# Patient Record
Sex: FEMALE | Race: WHITE | NOT HISPANIC OR LATINO | Employment: UNEMPLOYED | URBAN - METROPOLITAN AREA
[De-identification: names, ages, dates, MRNs, and addresses within clinical notes are randomized per-mention and may not be internally consistent; named-entity substitution may affect disease eponyms.]

---

## 2024-03-20 ENCOUNTER — OFFICE VISIT (OUTPATIENT)
Dept: FAMILY MEDICINE | Facility: CLINIC | Age: 49
End: 2024-03-20
Payer: COMMERCIAL

## 2024-03-20 VITALS
WEIGHT: 238.2 LBS | DIASTOLIC BLOOD PRESSURE: 88 MMHG | SYSTOLIC BLOOD PRESSURE: 131 MMHG | OXYGEN SATURATION: 99 % | TEMPERATURE: 98.6 F | HEART RATE: 92 BPM | RESPIRATION RATE: 16 BRPM | BODY MASS INDEX: 40.67 KG/M2 | HEIGHT: 64 IN

## 2024-03-20 DIAGNOSIS — F90.0 ATTENTION DEFICIT HYPERACTIVITY DISORDER (ADHD), PREDOMINANTLY INATTENTIVE TYPE: Primary | ICD-10-CM

## 2024-03-20 PROBLEM — K52.1 DRUG-INDUCED DIARRHEA: Status: ACTIVE | Noted: 2023-09-11

## 2024-03-20 PROBLEM — C50.911: Status: ACTIVE | Noted: 2022-03-05

## 2024-03-20 PROBLEM — T45.1X5A CHEMOTHERAPY-INDUCED NEUTROPENIA (H): Status: ACTIVE | Noted: 2022-03-07

## 2024-03-20 PROBLEM — D64.89 OTHER SPECIFIED ANEMIAS: Status: ACTIVE | Noted: 2023-02-13

## 2024-03-20 PROBLEM — L70.9 ACNE: Status: ACTIVE | Noted: 2018-02-12

## 2024-03-20 PROBLEM — D50.9 IRON DEFICIENCY ANEMIA: Status: ACTIVE | Noted: 2023-01-18

## 2024-03-20 PROBLEM — Z15.89 MONOALLELIC MUTATION OF ATM GENE: Status: ACTIVE | Noted: 2022-07-25

## 2024-03-20 PROBLEM — Z15.09 MONOALLELIC MUTATION OF ATM GENE: Status: ACTIVE | Noted: 2022-07-25

## 2024-03-20 PROBLEM — C50.911 MALIGNANT NEOPLASM OF RIGHT BREAST IN FEMALE, ESTROGEN RECEPTOR POSITIVE (H): Status: ACTIVE | Noted: 2023-03-27

## 2024-03-20 PROBLEM — D70.1 CHEMOTHERAPY-INDUCED NEUTROPENIA (H): Status: ACTIVE | Noted: 2022-03-07

## 2024-03-20 PROBLEM — Z90.11 S/P RIGHT MASTECTOMY: Status: ACTIVE | Noted: 2022-08-10

## 2024-03-20 PROBLEM — E89.40 POSTSURGICAL MENOPAUSE: Status: ACTIVE | Noted: 2024-03-20

## 2024-03-20 PROBLEM — R87.610 ASCUS OF CERVIX WITH NEGATIVE HIGH RISK HPV: Status: ACTIVE | Noted: 2023-02-03

## 2024-03-20 PROBLEM — Z15.01 MONOALLELIC MUTATION OF ATM GENE: Status: ACTIVE | Noted: 2022-07-25

## 2024-03-20 PROBLEM — Z17.0 MALIGNANT NEOPLASM OF RIGHT BREAST IN FEMALE, ESTROGEN RECEPTOR POSITIVE (H): Status: ACTIVE | Noted: 2023-03-27

## 2024-03-20 PROCEDURE — 99203 OFFICE O/P NEW LOW 30 MIN: CPT | Performed by: PHYSICIAN ASSISTANT

## 2024-03-20 RX ORDER — BUPROPION HYDROCHLORIDE 100 MG/1
100 TABLET, EXTENDED RELEASE ORAL
COMMUNITY

## 2024-03-20 RX ORDER — TRAZODONE HYDROCHLORIDE 50 MG/1
1 TABLET, FILM COATED ORAL AT BEDTIME
COMMUNITY
Start: 2024-03-05

## 2024-03-20 RX ORDER — VENLAFAXINE HYDROCHLORIDE 75 MG/1
75 CAPSULE, EXTENDED RELEASE ORAL
COMMUNITY
Start: 2022-09-24

## 2024-03-20 RX ORDER — VENLAFAXINE HYDROCHLORIDE 225 MG/1
TABLET, EXTENDED RELEASE ORAL DAILY
COMMUNITY

## 2024-03-20 RX ORDER — ANASTROZOLE 1 MG/1
1 TABLET ORAL
COMMUNITY
Start: 2024-01-02

## 2024-03-20 RX ORDER — CLINDAMYCIN AND BENZOYL PEROXIDE 10; 50 MG/G; MG/G
GEL TOPICAL
COMMUNITY
Start: 2023-03-27

## 2024-03-20 RX ORDER — LISDEXAMFETAMINE DIMESYLATE 50 MG/1
50 CAPSULE ORAL DAILY
COMMUNITY

## 2024-03-20 RX ORDER — DEXTROAMPHETAMINE SACCHARATE, AMPHETAMINE ASPARTATE MONOHYDRATE, DEXTROAMPHETAMINE SULFATE AND AMPHETAMINE SULFATE 2.5; 2.5; 2.5; 2.5 MG/1; MG/1; MG/1; MG/1
10 CAPSULE, EXTENDED RELEASE ORAL DAILY
COMMUNITY

## 2024-03-20 RX ORDER — BIOTIN 10000 MCG
10000 CAPSULE ORAL
COMMUNITY

## 2024-03-20 SDOH — HEALTH STABILITY: PHYSICAL HEALTH: ON AVERAGE, HOW MANY DAYS PER WEEK DO YOU ENGAGE IN MODERATE TO STRENUOUS EXERCISE (LIKE A BRISK WALK)?: 0 DAYS

## 2024-03-20 SDOH — HEALTH STABILITY: PHYSICAL HEALTH: ON AVERAGE, HOW MANY MINUTES DO YOU ENGAGE IN EXERCISE AT THIS LEVEL?: 0 MIN

## 2024-03-20 ASSESSMENT — PATIENT HEALTH QUESTIONNAIRE - PHQ9
SUM OF ALL RESPONSES TO PHQ QUESTIONS 1-9: 15
SUM OF ALL RESPONSES TO PHQ QUESTIONS 1-9: 15
10. IF YOU CHECKED OFF ANY PROBLEMS, HOW DIFFICULT HAVE THESE PROBLEMS MADE IT FOR YOU TO DO YOUR WORK, TAKE CARE OF THINGS AT HOME, OR GET ALONG WITH OTHER PEOPLE: VERY DIFFICULT

## 2024-03-20 ASSESSMENT — SOCIAL DETERMINANTS OF HEALTH (SDOH): HOW OFTEN DO YOU GET TOGETHER WITH FRIENDS OR RELATIVES?: MORE THAN THREE TIMES A WEEK

## 2024-03-20 NOTE — COMMUNITY RESOURCES LIST (ENGLISH)
March 20, 2024           YOUR PERSONALIZED LIST OF SERVICES & PROGRAMS           & RECREATION    Sports      [place] - Sports clubs and recreational activities  [address] [place] [address] (Distance: 4.5 miles)  Phone: [phone]  Website: http://www.coin4ce  Language: English  Fee: Self pay  Accessibility: Ada accessible, Translation services      [place] - Sports clubs and recreational activities - [facility]  [address] [place] [address] (Distance: 2.2 miles)  Language: English  Fee: Self pay, Sliding scale      [facility] - Adult Enrichment  Phone: [phone]  Website: https://Anametrix/adults-seniors/adult-enrichment/  Language: English  Hours: Mon 7:30 AM - 4:00 PM Tue 7:30 AM - 4:00 PM Wed 7:30 AM - 4:00 PM Thu 7:30 AM - 4:00 PM Fri 7:30 AM - 4:00 PM    Classes/Groups      [facility] - Personal Training  [address] [place] [address] (Distance: 2.2 miles)  Phone: [phone]  Website: https://www.Yamli/locations/[facility]/health__fitness/personal_training  Language: English      [facility] - FREE GROUP EXERCISE CLASSES  [address] [place] [address] (Distance: 2.2 miles)  Phone: [phone]  Website: https://www.Yamli/locations/[facility]/health__fitness/free_group_exercise_classes  Language: English      Workouts - Exercise Class/At Home Workouts  Website: https://homeworkouts.org/  Language: English               IMPORTANT NUMBERS & WEBSITES        Emergency Services  911  .   United Way  211 http://211unitedway.org  .   Poison Control  (234) 387-7714 http://mnpoison.org http://wisconsinpoison.org  .     Suicide and Crisis Lifeline  988 http://988lifeline.org  .   Childhelp National Child Abuse Hotline  895.463.7321 http://Childhelphotline.org   .   National Sexual Assault Hotline  (550) 825-9099 (HOPE) http://[facility] [place].org   .     [facility]  Safeline  (522) 716-2790 (RUNAWAY) http://ClerkruRover.com.org  .   Pregnancy & Postpartum Support  Call/text 383-868-6218  [place]: http://ppsupportmn.org  [place]: http://psichapters.com/wi  .   Substance Abuse National Helpline ([facility])  193-844-HELP (5566) http://Findtreatment.gov   .                DISCLAIMER: [employer] does not endorse any service providers mentioned in this resource list. [employer] does not guarantee that the services mentioned in this resource list will be available to you or will improve your health or wellness.    [facility]

## 2024-03-20 NOTE — COMMUNITY RESOURCES LIST (ENGLISH)
March 20, 2024           YOUR PERSONALIZED LIST OF SERVICES & PROGRAMS           & RECREATION    Sports      [place] - Sports clubs and recreational activities - [facility]  [address] [place] [address] (Distance: 2.2 miles)  Language: English  Fee: Self pay, Sliding scale      [employer] - Sports clubs and recreational activities  [address] [place] [address] (Distance: 4.5 miles)  Phone: [phone]  Website: http://www.Open Dynamics  Language: English  Fee: Self pay  Accessibility: Ada accessible, Translation services      [facility] - Adult Enrichment  Phone: [phone]  Website: https://Element ID/adults-seniors/adult-enrichment/  Language: English  Hours: Mon 7:30 AM - 4:00 PM Tue 7:30 AM - 4:00 PM Wed 7:30 AM - 4:00 PM Thu 7:30 AM - 4:00 PM Fri 7:30 AM - 4:00 PM    Classes/Groups      [facility] - Personal Training  [address] [place] [address] (Distance: 2.2 miles)  Phone: [phone]  Website: https://www.GamyTech/locations/[facility]/health__fitness/personal_training  Language: English      [facility] - FREE GROUP EXERCISE CLASSES  [address] [place] [address] (Distance: 2.2 miles)  Phone: [phone]  Website: https://www.GamyTech/locations/[facility]/health__fitness/free_group_exercise_classes  Language: English      Workouts - Exercise Class/At Home Workouts  Website: https://homeworkouts.org/  Language: English               IMPORTANT NUMBERS & WEBSITES        Emergency Services  911  .   United Way  211 http://211unitedway.org  .   Poison Control  (798) 261-3554 http://mnpoison.org http://wisconsinpoison.org  .     Suicide and Crisis Lifeline  988 http://988lifeline.org  .   Childhelp National Child Abuse Hotline  749.976.4554 http://Childhelphotline.org   .   National Sexual Assault Hotline  (382) 650-4852 (HOPE) http://[facility].org   .     [facility]  Safeline  (987) 917-9457 (RUNAWAY) http://Lud[employer] LabsruManhattan Scientifics.org  .   Pregnancy & Postpartum Support  Call/text [phone]  [place]: http://ppsupportmn.org  [place]: http://psichapters.com/wi  .   Substance Abuse National Helpline ([facility])  338-795-HELP (6609) http://Findtreatment.gov   .                DISCLAIMER: [employer] does not endorse any service providers mentioned in this resource list. [employer] does not guarantee that the services mentioned in this resource list will be available to you or will improve your health or wellness.    [facility]

## 2024-03-20 NOTE — COMMUNITY RESOURCES LIST (ENGLISH)
March 20, 2024           YOUR PERSONALIZED LIST OF SERVICES & PROGRAMS           & RECREATION    Sports      [place] - Sports clubs and recreational activities - [facility]  [address] [place] [address] (Distance: 2.2 miles)  Language: English  Fee: Self pay, Sliding scale      [employer] - Sports clubs and recreational activities  [address] [place] [address] (Distance: 4.5 miles)  Phone: [phone]  Website: http://www.SportEmp.com  Language: English  Fee: Self pay  Accessibility: Ada accessible, Translation services      [facility] - Adult Enrichment  Phone: [phone]  Website: https://Ezose Sciences/adults-seniors/adult-enrichment/  Language: English  Hours: Mon 7:30 AM - 4:00 PM Tue 7:30 AM - 4:00 PM Wed 7:30 AM - 4:00 PM Thu 7:30 AM - 4:00 PM Fri 7:30 AM - 4:00 PM    Classes/Groups      [facility] - Personal Training  [address] [place] [address] (Distance: 2.2 miles)  Phone: [phone]  Website: https://www.Promodity/locations/[facility]/health__fitness/personal_training  Language: English      [facility] - FREE GROUP EXERCISE CLASSES  [address] [place] [address] (Distance: 2.2 miles)  Phone: [phone]  Website: https://www.Promodity/locations/[facility]/health__fitness/free_group_exercise_classes  Language: English      Workouts - Exercise Class/At Home Workouts  Website: https://homeworkouts.org/  Language: English               IMPORTANT NUMBERS & WEBSITES        Emergency Services  911  .   United Way  211 http://211unitedway.org  .   Poison Control  (382) 327-1052 http://mnpoison.org http://wisconsinpoison.org  .     Suicide and Crisis Lifeline  988 http://988lifeline.org  .   Childhelp National Child Abuse Hotline  654.701.8946 http://Childhelphotline.org   .   National Sexual Assault Hotline  (178) 473-2654 (HOPE) http://[facility].org   .     [facility]  Safeline  (194) 231-6104 (RUNAWAY) http://AusraruMichelle Kaufmann Designs.org  .   Pregnancy & Postpartum Support  Call/text [phone]  [place]: http://ppsupportmn.org  [place]: http://psichapters.com/wi  .   Substance Abuse National Helpline ([facility])  303-681-HELP (1163) http://Findtreatment.gov   .                DISCLAIMER: [employer] does not endorse any service providers mentioned in this resource list. [employer] does not guarantee that the services mentioned in this resource list will be available to you or will improve your health or wellness.    [facility]

## 2024-03-20 NOTE — PROGRESS NOTES
"  Assessment & Plan     Attention deficit hyperactivity disorder (ADHD), predominantly inattentive type  Patient requesting referral to see Dr. Vega for her ADHD.  Currently on medications, has been tested previously.  Referral placed per patient request.  - Adult Mental Health  Referral        BMI  Estimated body mass index is 41.21 kg/m  as calculated from the following:    Height as of this encounter: 1.619 m (5' 3.75\").    Weight as of this encounter: 108 kg (238 lb 3.2 oz).   Weight management plan: Discussed healthy diet and exercise guidelines    Depression Screening Follow Up        3/20/2024     8:50 AM   PHQ   PHQ-9 Total Score 15   Q9: Thoughts of better off dead/self-harm past 2 weeks Not at all         3/20/2024     8:50 AM   Last PHQ-9   1.  Little interest or pleasure in doing things 2   2.  Feeling down, depressed, or hopeless 1   3.  Trouble falling or staying asleep, or sleeping too much 3   4.  Feeling tired or having little energy 3   5.  Poor appetite or overeating 3   6.  Feeling bad about yourself 1   7.  Trouble concentrating 2   8.  Moving slowly or restless 0   Q9: Thoughts of better off dead/self-harm past 2 weeks 0   PHQ-9 Total Score 15         Follow Up Actions Taken  Crisis resource information provided in After Visit Summary  Referred patient back to PCP       Risks, benefits and alternatives were discussed with patient. Agreeable to the plan of care.      Opal Hilario is a 48 year old, presenting for the following health issues:  Referral (Needs referral for psychology to [facility]/)        3/20/2024     8:51 AM   Additional Questions   Roomed by Yanira CHEW CMA     HPI     Patient is here today for referral for her ADHD  She wants to see Dr. [facility] at [facility]  Has been diagnosed with ADHD, on Adderall and Vyvanse  She still really struggles with her ADHD despite medication      Review of Systems  Constitutional, HEENT, cardiovascular, " pulmonary, gi and gu systems are negative, except as otherwise noted.      Objective    BP (!) 126/93 (BP Location: Left arm, Patient Position: Sitting, Cuff Size: Adult Regular)   Pulse 90   Temp 98.6  F (37  C) (Oral)   Resp 16   Wt 108 kg (238 lb 3.2 oz)   LMP 03/17/2022 (Exact Date)   SpO2 99%   There is no height or weight on file to calculate BMI.  Physical Exam   GENERAL: alert and no distress  NECK: no adenopathy, no asymmetry, masses, or scars  RESP: lungs clear to auscultation - no rales, rhonchi or wheezes  CV: regular rate and rhythm, normal S1 S2, no S3 or S4, no murmur, click or rub, no peripheral edema  MS: no gross musculoskeletal defects noted, no edema          Signed Electronically by: Nishi Hurd PA-C    Answers submitted by the patient for this visit:  Patient Health Questionnaire (Submitted on 3/20/2024)  If you checked off any problems, how difficult have these problems made it for you to do your work, take care of things at home, or get along with other people?: Very difficult  PHQ9 TOTAL SCORE: 15

## 2024-07-07 ASSESSMENT — PATIENT HEALTH QUESTIONNAIRE - PHQ9
SUM OF ALL RESPONSES TO PHQ QUESTIONS 1-9: 9
10. IF YOU CHECKED OFF ANY PROBLEMS, HOW DIFFICULT HAVE THESE PROBLEMS MADE IT FOR YOU TO DO YOUR WORK, TAKE CARE OF THINGS AT HOME, OR GET ALONG WITH OTHER PEOPLE: VERY DIFFICULT
SUM OF ALL RESPONSES TO PHQ QUESTIONS 1-9: 9

## 2024-07-08 ENCOUNTER — VIRTUAL VISIT (OUTPATIENT)
Dept: PSYCHIATRY | Facility: CLINIC | Age: 49
End: 2024-07-08
Attending: PSYCHOLOGIST
Payer: COMMERCIAL

## 2024-07-08 DIAGNOSIS — F33.0 MILD EPISODE OF RECURRENT MAJOR DEPRESSIVE DISORDER (H): Primary | ICD-10-CM

## 2024-07-08 DIAGNOSIS — F41.9 ANXIETY DISORDER, UNSPECIFIED TYPE: ICD-10-CM

## 2024-07-08 DIAGNOSIS — F90.0 ATTENTION DEFICIT HYPERACTIVITY DISORDER (ADHD), PREDOMINANTLY INATTENTIVE TYPE: ICD-10-CM

## 2024-07-08 PROCEDURE — 90837 PSYTX W PT 60 MINUTES: CPT | Mod: 95 | Performed by: PSYCHOLOGIST

## 2024-07-08 NOTE — PROGRESS NOTES
[facility]  [address], [address]  [place] [address]  [phone]     OUTPATIENT PSYCHOTHERAPY ENCOUNTER      PATIENT     Name: Yanelis Lyons  YOB: 1975     SERVICES PROVIDED    Date of Service: 7/8/2024   Time of Service: 1005 to 1113 am (67 Minutes)   Type of Service: 08905 Individual Psychotherapy (53+ min)   Service Provider: David Vega, , LP     TELEMEDICINE ENCOUNTER METHODS     Telemedicine psychotherapy was conducted due to the preference Yanelis during scheduling. The patient's condition was safely assessed and treated via synchronous audio and visual telemedicine encounter. A secure real time interactive audio and visual telecommunication system (videoconference via gauzz) was used for the visit.     Patient Visit Location: Home in [place]      Present For Encounter: Yanelis     Provider Visit Location: HIPAA compliant location within provider home       ENCOUNTER SYNOPSIS     Yanelis participated in a psychotherapy session today with David Vega, PhD, LP. Background information, history, current condition, and available medical record notes were reviewed, and a brief clinical interview was conducted with Yanelis to update the treatment focus and planning as indicated. There was opportunity for Yanelis to discuss and process recent life happenings. The session included providing Yanelis with an evidence-informed, strengths-based, whole-person mental health and wellbeing-focused model of psychotherapy (see Treatment Plan and Psychotherapy Interventions). Coordination of care was also planned with other healthcare providers working with Yanelis if indicated.     BACKGROUND INFORMATION      Yanelis lives with her father and brother.  She has a BA degree in physics.  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    Yanelis is currently unemployed and her more recent work  "history is \"spotty.\"  She has reportedly been fired from many jobs due to ADHD symptoms.  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    Yanelis was diagnosed with breast cancer in 2022.  She was successfully treated with chemotherapy, mastectomy, and radiation.    Yanelis is receiving mental health care.  She receives psychiatric care from Dr. Leora Driscoll.  Her diagnosis is reported to be ADHD and she is currently on Adderall.  In addition, she participates in ADHD support groups.    Currently, Yanelis is seeking ADHD informed psychotherapy with the current provider.  Her goal is to become \"reliable and productive again\" and learn how to deal with her executive functioning challenges.    Psychiatric Symptoms         The following symptoms and concerns are to be targeted in the treatment of Yanelis.      Inattention/Hyperactivity/Impulsivity: Reported she was diagnosed with ADHD at the age of 34; even though she is on Adderall, Yanelis reported current symptoms of inattention and to some degree also hyperactivity/impulsivity; in particular she gets overwhelmed with work/tasks and shuts down; indicated her executive functioning is poor and especially being disorganized, having a hard time staying focused, and getting frustrated a lot   Depression: Reported mild recurrent depressive symptoms including recently sadness, irritability, decreased energy, low motivation, poor concentration, sleep disturbance, negative thoughts    Hypomania/Josephine: None reported    Anxiety: Reported recurrent symptoms of worrying, ruminating, nervousness, heightened physical tension, avoidance     Panic: None reported   Obsessions/Compulsions: None reported   Post-Traumatic Stress: None reported   Sleep Problems and Fatigue: Reported her sleep is a \"mess\" since being treated " "for cancer with side effects coming from the many medications she has been on; noted she uses trazodone intermittently for sleep as needed   Alcohol Abuse: None reported   Drug Abuse: None reported     Psychotic: None reported     Suicidal Thoughts: None reported recently; had suicidal ideation as a teenager    Cutting/Self-Injury: None reported         Questionnaire Data (Submitted on 7/7/2024)    PHQ9 Total Score: 9 (mild depression)     Functional Concerns      The following functional concerns are to be targeted in the treatment of Yanelis.      Home: Reported struggling with household tasks and upkeep      School: Reported having difficulty being organized and completing homework and academic tasks in high school and college      Work: Reported is distractible, off task, forgets things, etc. on the job and she has been fired from several jobs as a result    Financial: Reported prior history of significant credit card debt due to overspending        History     The following reflect pertinent historical findings identified for Yanelis.      Developmental Delays: None reported    Family Problems: Reported that her mother passed away in 2021 from a lung disease; indicated her mother was \"the center of the family\" and the family is struggling without her    Child Abuse/Neglect/Trauma: None reported    Educational Problems: None reported    Mental Health Problems: Reported had undiagnosed depression during her teenage years and that she was officially diagnosed while she attended college; noted she has been struggling with recurrent depressive symptoms throughout adulthood    Physical Health Problems: Reported prior history of breast cancer that was successfully treated and is currently in remission   Family History of Mental Health Problems: Reported suspicion that her father has ADHD but this has not been officially diagnosed; believes her brother is depressed     Other Problems: None reported      Strengths " and Protective Factors      The following reflect strengths and protective factors that could be leveraged in treatment of Yanelis.      Personal: Reported she is persistent and that she is good with knitting, sewing, cross stitching, and painting     Support System: Reported to be her father and brother      TREATMENT PLAN AND PSYCHOTHERAPY UPDATE FOR THIS ENCOUNTER     Diagnoses Being Treated      Yanelis qualifies for diagnoses of ADHD-I; major depression, recurrent, mild; anxiety disorder, unspecified     Treatment Symptoms Goals Addressed and Progress      Based on the Background Information above, the following personalized psychiatric symptom domains for Yanelis are targeted for improvement through psychotherapy. The goal is to achieve an 8 or higher over the course of treatment. Progress to date ranges from 0 Limited to 5 Some to 10 Significant. This progress is episodically reviewed with Yanelis to make treatment decisions.     Improve Understanding of Condition/Illness: Baseline estimated to be 6  Reduce Inattention: Baseline estimated to be 5  Reduce Depression: Baseline estimated to be 5  Reduce Anxiety: Baseline estimated to be 6  Reduce Sleep Problems and Fatigue: Baseline estimated to be 5    Functional Goals Addressed and Progress      Based on the Background Information above, the following personalized functional domains for Yanelis are targeted for improvement through psychotherapy. The goal is to achieve an 8 or higher over the course of treatment. Progress to date ranges from 0 Limited to 5 Some to 10 Significant. This progress is episodically reviewed with Yanelis to make treatment decisions.     Home: Baseline estimated to be 5  School: NA currently  Work: Baseline estimated to be 4  Financial: NA currently    Mental Health and Wellbeing Health Practices Addressed and Progress     Using a brief psychotherapy collaborative decision aid, Yanelis was guided to make self-ratings for  perceived level of mastery in 12 Health Practices that are aligned with Four Worlds of Mental Health and Wellbeing. Ratings range from 0 = Not Good at This to 10 = Great at This. Ratings below were recorded on 7/8/2024.      Internal World: Regulation of Emotions, Thoughts, and Stress   Soothe Health Practice: I am calm and manage my emotions - Historical to Current: 5.5  Think Health Practice: I have positive, hopeful and helpful thoughts - Historical to Current: 5 (indicated being self-critical)  Rest Health Practice: I am well rested and energized - Historical to Current: 4    Physical World: Physical Health      Hydrate Health Practice: I drink enough water every day and stay hydrated - Historical to Current: 6  Nourish Health Practice: I eat healthy - Historical to Current: 4 (indicated does not eat breakfast)  Move Health Practice: I am physically active - Historical to Current: 2    External World: Social Relationships and Productive Behaviors   Express Health Practice: I listen to others and express myself well  - Historical to Current: 8  Connect Health Practice: I have positive, healthy and meaningful relationships - Historical to Current: 7  Activate Health Practice: I am responsible and successful at home, work, school - Historical to Current: 3      Spiritual World: Contemplation, Purpose and Meaning, and Living According to Beliefs   Observe Health Practice: I am thankful, accepting, and live in the moment - Historical to Current: 7.5  Seek Health Practice: I do things that give me meaning and purpose - Historical to Current: 7.5  Believe Health Practice: My values and beliefs guide how I am living - Historical to Current: 7.5    Psychotherapy Interventions Provided to Improve Symptoms, Functioning, and Mental Health and Wellbeing      This session focused on introducing Yanelis to a strengths-based, whole-person mental health and wellbeing-focused model of psychotherapy. This approach incorporates  empirically validated practice elements derived from cognitive behavioral therapy, acceptance and commitment therapy, positive psychology, mindfulness, and habit formation science. Specific psychotherapeutic modules within the model are personalized to help Yanelis work on and make progress with treatment goals. Culturally and trauma informed care principles are incorporated to contextualize therapeutic strategies to best fit the life experiences and views of Yanelis as indicated.     Based on above ratings and patient preference, Yanelis will be guided to work on personalized psychotherapeutic modules within identified Health Practices to improve daily functioning and overall mental health and wellbeing.  These will be determined and focused on during the next session.      MENTAL STATUS EXAM     Yanelis was observed for the following indicators of mental status.     Appearance/Behavior: Adequate grooming, appropriate attire, good eye contact, calm, cooperative, no abnormal movements   Speech: Rate, volume and tone appropriate; no push or pressure; no rapid speech     Mood/Affect: Calm, affect congruent to situation   Thought Process/Content: Coherent, linear; no reports or evidence of auditory hallucinations; no reports or evidence of visual hallucinations    Thought Associations: Logical, no looseness of associations, no flight of ideas, no tangentially, no circumstantiality   Insight/Judgment: Adequate insight into condition and need for treatment; judgement not impaired   Orientation: Alert and oriented to person place, time, and circumstance   Recent and Past Memory: Good    Attention Span/Concentration: Good     Language: English with estimated vocabulary to be average or above   Fund of Knowledge: Estimated to be average or above fund of knowledge      SAFETY PLAN - NA, no SI or SIB     RECOMMENDATIONS     Based on a medical records review and the results of this encounter, it is recommended that  Yanelis participate in the following behavioral health services.      Mental Health Outpatient Psychotherapy: Individual-focused skills and habit training with therapeutic processing to improve mental health and wellbeing, conducted by Dr. Vega   Psychiatric Evaluation and Services: Continue consulting with Dr. Driscoll for psychiatric care including psychiatric diagnostic clarification, medication management, care coordination, and psychotherapy as needed    Primary and Specialty Care Services: Continue consulting with specialty care providers for cancer and PCP for healthcare, medication management, and care coordination as needed        DISPOSITION AND PLAN      Psychotherapy was conducted with Yanelis to make progress on treatment goals, enhance daily functioning, and improve mental health and wellbeing. Motivational interviewing was used to promote insight and engagement and to personalize psychotherapy interventions. If applicable, a safety plan was formulated and reviewed with Yanelis.      The engagement of Yanelis during the session is assessed by this provider as: High     The progress of Etienne on treatment plan goals and objectives is assessed by this provider as: NA      In line with feedback informed therapy, Yanelis was guided to complete ratings of perceived Helpfulness and Hopefulness regarding the current encounter, and the results and are recorded below. The results were collaboratively discussed to make adjustments or improvements in subsequent psychotherapy encounters.      Degree of helpfulness of this visit on a scale ranging from 0 is Not Helpful to 10 is Very Helpful - Historical to Current: Did not review  Degree of hopefulness in improving mental health and wellbeing on a scale ranging from 0 Not Hopeful to 10 Very Hopeful - Historical to Current: Did not review    The plan is for Yanelis to follow up with this provider for outpatient psychotherapy and will seek  additional recommended services as indicated to continue work on treatment goals.     If there are any questions regarding this information please contact the [facility] at [phone].    David Vega, PhD, LP   Professor of Psychiatry and Behavioral Sciences  [facility]

## 2024-07-18 ENCOUNTER — VIRTUAL VISIT (OUTPATIENT)
Dept: PSYCHIATRY | Facility: CLINIC | Age: 49
End: 2024-07-18
Attending: PSYCHOLOGIST
Payer: COMMERCIAL

## 2024-07-18 DIAGNOSIS — F33.0 MILD EPISODE OF RECURRENT MAJOR DEPRESSIVE DISORDER (H): ICD-10-CM

## 2024-07-18 DIAGNOSIS — F90.0 ATTENTION DEFICIT HYPERACTIVITY DISORDER (ADHD), PREDOMINANTLY INATTENTIVE TYPE: Primary | ICD-10-CM

## 2024-07-18 DIAGNOSIS — F41.9 ANXIETY DISORDER, UNSPECIFIED TYPE: ICD-10-CM

## 2024-07-18 PROCEDURE — 90837 PSYTX W PT 60 MINUTES: CPT | Mod: 95 | Performed by: PSYCHOLOGIST

## 2024-07-23 NOTE — PROGRESS NOTES
[facility]  [address], [address]  [place] [address]  [phone]     OUTPATIENT PSYCHOTHERAPY ENCOUNTER      PATIENT     Name: Yanelis Lyons  YOB: 1975     SERVICES PROVIDED    Date of Service: 7/18/2024   Time of Service: 300 to 401 pm (61 Minutes)   Type of Service: 15477 Individual Psychotherapy (53+ min)   Service Provider: David Vega, PhD, LP     TELEMEDICINE ENCOUNTER METHODS     Telemedicine psychotherapy was conducted due to the preference Yanelis during scheduling. The patient's condition was safely assessed and treated via synchronous audio and visual telemedicine encounter. A secure real time interactive audio and visual telecommunication system (videoconference via [employer]) was used for the visit.     Patient Visit Location: Home in [place]      Present For Encounter: Yanelis     Provider Visit Location: HIPAA compliant location within provider home       ENCOUNTER SYNOPSIS     Yanelis participated in a psychotherapy session today with David Vega, PhD, LP. Background information, history, current condition, and available medical record notes were reviewed, and a brief clinical interview was conducted with Yanelis to update the treatment focus and planning as indicated. There was opportunity for Yanelis to discuss and process recent life happenings. The session included providing Yanelis with an evidence-informed, strengths-based, whole-person mental health and wellbeing-focused model of psychotherapy (see Treatment Plan and Psychotherapy Interventions). Coordination of care was also planned with other healthcare providers working with Yanelis if indicated.     BACKGROUND INFORMATION (From 7/8/2024 Intake; Updated 7/18/2024)      Yanelis lives with her father and brother.  She has a BA degree in physics.  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    Yanelis is currently  "unemployed and her more recent work history is \"spotty.\"  She has reportedly been fired from many jobs due to ADHD symptoms.  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    Yanelis was diagnosed with breast cancer in 2022.  She was successfully treated with chemotherapy, mastectomy, and radiation.    Yanelis is receiving mental health care.  She receives psychiatric care from Dr. Leora Driscoll.  Her diagnosis is reported to be ADHD and she is currently on Adderall.  In addition, she participates in ADHD support groups.    Currently, Yanelis is seeking ADHD informed psychotherapy with the current provider.  Her goal is to become \"reliable and productive again\" and learn how to deal with her executive functioning challenges.    Psychiatric Symptoms         The following symptoms and concerns are to be targeted in the treatment of Yanelis.      Inattention/Hyperactivity/Impulsivity: Reported she was diagnosed with ADHD at the age of 34; even though she is on Adderall, Yanelis reported current symptoms of inattention and to some degree also hyperactivity/impulsivity; in particular she gets overwhelmed with work/tasks and shuts down; indicated her executive functioning is poor and especially being disorganized, having a hard time staying focused, and getting frustrated a lot   Depression: Reported mild recurrent depressive symptoms including recently sadness, irritability, decreased energy, low motivation, poor concentration, sleep disturbance, negative thoughts    Hypomania/Josephine: None reported    Anxiety: Reported recurrent symptoms of worrying, ruminating, nervousness, heightened physical tension, avoidance     Panic: None reported   Obsessions/Compulsions: None reported   Post-Traumatic Stress: None reported   Sleep Problems and Fatigue: Reported her " "sleep is a \"mess\" since being treated for cancer with side effects coming from the many medications she has been on; noted she uses trazodone intermittently for sleep as needed   Alcohol Abuse: None reported   Drug Abuse: None reported     Psychotic: None reported     Suicidal Thoughts: None reported recently; had suicidal ideation as a teenager    Cutting/Self-Injury: None reported         Questionnaire Data (Submitted on 7/7/2024)    PHQ9 Total Score: 9 (mild depression)     Functional Concerns      The following functional concerns are to be targeted in the treatment of Yanelis.      Home: Reported struggling with household tasks and upkeep      School: Reported having difficulty being organized and completing homework and academic tasks in high school and college      Work: Reported is distractible, off task, forgets things, etc. on the job and she has been fired from several jobs as a result    Financial: Reported prior history of significant credit card debt due to overspending        History     The following reflect pertinent historical findings identified for Yanelis.      Developmental Delays: None reported    Family Problems: Reported that her mother was impatient with her growing up and often yelled at her; indicated that her mother passed away in 2021 from a lung disease; observed that her mother was \"the center of the family\" and the family is struggling without her    Child Abuse/Neglect/Trauma: None reported    Educational Problems: None reported    Mental Health Problems: Reported had undiagnosed depression during her teenage years and that she was officially diagnosed while she attended college; noted she has been struggling with recurrent depressive symptoms throughout adulthood    Physical Health Problems: Reported prior history of breast cancer that was successfully treated and is currently in remission   Family History of Mental Health Problems: Reported suspicion that her father has ADHD " but this has not been officially diagnosed; believes her brother is depressed     Other Problems: None reported      Strengths and Protective Factors      The following reflect strengths and protective factors that could be leveraged in treatment of Yanelis.      Personal: Reported she is persistent and that she is good with knitting, sewing, cross stitching, and painting     Support System: Reported to be her father and brother      CHECK-IN FOR THIS ENCOUNTER     Recent Happenings     Checked in with Yanelis to review how it has been going recently     Noteworthy Events: Reported routine life events  Noteworthy Stressors/Problems: Reported tension with her brother after an argument; noted a visit with her relatives were politics was discussed and that was stressful       Recent Health Concerns     Checked in with Yanelis to review current health concerns and rated them ranging from 0 = Currently Low Concern to 10 = Currently Very High Concern.      Emotional Health Concerns: Current level of concern about overall distress, anxiety, depression, or stress - Historical to Current: Moderate  Notes: Reported stress and some worrying     Physical Health Concerns: Current level of concern about overall physical health, physical activity, body weight, health habits, or pain - Historical to Current: Moderate  Notes: Reported feeling generally healthy but acknowledges difficulties with mobility and walking due to being sedentary  Living Health Concerns: Current level of concern about functioning at home, at school and/or work, and in important relationships - Historical to Current: NA  Notes: Did not review    Recent Safety Concerns    Self: None reported   Others: None reported       Desired Focus for Encounter     Checked in with Yanelis to determine any desired goals, topics, skills, or habits of focus for this session.      Areas of Focus: None reported      TREATMENT PLAN AND PSYCHOTHERAPY UPDATE FOR THIS  ENCOUNTER     Diagnoses Being Treated      Yanelis qualifies for diagnoses of ADHD-I; major depression, recurrent, mild; anxiety disorder, unspecified     Treatment Symptoms Goals Addressed and Progress      Based on the Background Information above, the following personalized psychiatric symptom domains for Yanelis are targeted for improvement through psychotherapy. The goal is to achieve an 8 or higher over the course of treatment. Progress to date ranges from 0 Limited to 5 Some to 10 Significant. This progress is episodically reviewed with Yanelis to make treatment decisions.     Improve Understanding of Condition/Illness: Baseline estimated to be 6  Reduce Inattention: Baseline estimated to be 5  Reduce Depression: Baseline estimated to be 5  Reduce Anxiety: Baseline estimated to be 6  Reduce Sleep Problems and Fatigue: Baseline estimated to be 5    Functional Goals Addressed and Progress      Based on the Background Information above, the following personalized functional domains for Yanelis are targeted for improvement through psychotherapy. The goal is to achieve an 8 or higher over the course of treatment. Progress to date ranges from 0 Limited to 5 Some to 10 Significant. This progress is episodically reviewed with Yanelis to make treatment decisions.     Home: Baseline estimated to be 5  School: NA currently  Work: Baseline estimated to be 4  Financial: NA currently    Mental Health and Wellbeing Health Practices Addressed and Progress     Using a brief psychotherapy collaborative decision aid, Yanelis was guided to make self-ratings for perceived level of mastery in 12 Health Practices that are aligned with Four Worlds of Mental Health and Wellbeing. Ratings range from 0 = Not Good at This to 10 = Great at This. Ratings below were recorded on 7/8/2024.      Internal World: Regulation of Emotions, Thoughts, and Stress   Soothe Health Practice: I am calm and manage my emotions - Historical to  "Current: 5.5  Think Health Practice: I have positive, hopeful and helpful thoughts - Historical to Current: 5 (indicated being self-critical)  Rest Health Practice: I am well rested and energized - Historical to Current: 4    Physical World: Physical Health      Hydrate Health Practice: I drink enough water every day and stay hydrated - Historical to Current: 6  Nourish Health Practice: I eat healthy - Historical to Current: 4 (indicated does not eat breakfast)  Move Health Practice: I am physically active - Historical to Current: 2    External World: Social Relationships and Productive Behaviors   Express Health Practice: I listen to others and express myself well  - Historical to Current: 8  Connect Health Practice: I have positive, healthy and meaningful relationships - Historical to Current: 7  Activate Health Practice: I am responsible and successful at home, work, school - Historical to Current: 3      Spiritual World: Contemplation, Purpose and Meaning, and Living According to Beliefs   Observe Health Practice: I am thankful, accepting, and live in the moment - Historical to Current: 7.5  Seek Health Practice: I do things that give me meaning and purpose - Historical to Current: 7.5  Believe Health Practice: My values and beliefs guide how I am living - Historical to Current: 7.5    Psychotherapy Interventions Provided to Improve Symptoms, Functioning, and Mental Health and Wellbeing      The first part of the session focused on processing family dynamics with Yanelis.  She noted being \"the family scapegoat\" in the past and currently.  Noted trying to set and enforce boundaries with family members.  Noted it is hard to avoid conflict with family members and it stresses her out sometimes.    This session also focused on introducing Yanelis to a strengths-based, whole-person mental health and wellbeing-focused model of psychotherapy. This approach incorporates empirically validated practice elements derived " from cognitive behavioral therapy, acceptance and commitment therapy, positive psychology, mindfulness, and habit formation science. Specific psychotherapeutic modules within the model are personalized to help Yanelis work on and make progress with treatment goals. Culturally and trauma informed care principles are incorporated to contextualize therapeutic strategies to best fit the life experiences and views of Yanelis as indicated.     Based on above ratings and patient preference, Yanelis was guided to work on personalized psychotherapeutic modules within identified Health Practices that are italicized below to improve daily functioning and overall mental health and wellbeing. Instruction, training, guidance, along with traditional therapeutic processing, is used to promote growth in these areas for Yanelis.      Move Health Practice: Being Physically Active  Be Active Module: Being more active each day (habit formation and positive psychology)  Be Strong and Mobile Module: Building your strength, flexibility, and balance (habit formation and positive psychology)  Be Fit Module: Building your physical fitness (habit formation and positive psychology)   Notes: Reviewed, discussed, and planned for how Yanelis can be more active and less sedentary; reviewed the positive mental health and brain capacity benefits of more regular exercise; she made plans to ride her bike and walk more; she set up a plan for developing a time-based habit (see below)     Get Ready and Take Action: Promoting and Guiding Change  Prepare to Change: Increase insight, vision, desire, motivation, confidence, and commitment to promote a change mindset  Notes: Reviewed, discussed, and plan for how Yanelis can develop her change mindset  Form a Time-Based Habit: Leveraging habit loop at a specified time; At [time cue], I will [action], to gain [reward] (e.g., At 7 am, I will walk 2 miles, to feel better physically and emotionally)     Notes: Reviewed, discussed, and planned for how Yanelis can utilize a time-based habit, specifically at 9 AM most days she will both out to exercise either by walking or riding her bike, for an undetermined length of time that she will gradually determine as she begins her exercise habit    MENTAL STATUS EXAM     Yanelis was observed for the following indicators of mental status.     Appearance/Behavior: Adequate grooming, appropriate attire, good eye contact, calm, cooperative, no abnormal movements   Speech: Rate, volume and tone appropriate; no push or pressure; no rapid speech     Mood/Affect: Calm, affect congruent to situation   Thought Process/Content: Coherent, linear; no reports or evidence of auditory hallucinations; no reports or evidence of visual hallucinations    Thought Associations: Logical, no looseness of associations, no flight of ideas, no tangentially, no circumstantiality   Insight/Judgment: Adequate insight into condition and need for treatment; judgement not impaired   Orientation: Alert and oriented to person place, time, and circumstance   Recent and Past Memory: Good    Attention Span/Concentration: Good     Language: English with estimated vocabulary to be average or above   Fund of Knowledge: Estimated to be average or above fund of knowledge      SAFETY PLAN - NA, no SI or SIB     RECOMMENDATIONS     Based on a medical records review and the results of this encounter, it is recommended that Yanelis participate in the following behavioral health services.      Mental Health Outpatient Psychotherapy: Individual-focused skills and habit training with therapeutic processing to improve mental health and wellbeing, conducted by Dr. Vega   Psychiatric Evaluation and Services: Continue consulting with Dr. Driscoll for psychiatric care including psychiatric diagnostic clarification, medication management, care coordination, and psychotherapy as needed    Primary and Specialty Care  Services: Continue consulting with specialty care providers for cancer and PCP for healthcare, medication management, and care coordination as needed        DISPOSITION AND PLAN      Psychotherapy was conducted with Yanelis to make progress on treatment goals, enhance daily functioning, and improve mental health and wellbeing. Motivational interviewing was used to promote insight and engagement and to personalize psychotherapy interventions. If applicable, a safety plan was formulated and reviewed with Yanelis.      The engagement of Yanelis during the session is assessed by this provider as: High     The progress of Etienne on treatment plan goals and objectives is assessed by this provider as: Moderate       In line with feedback informed therapy, Yanelis was guided to complete ratings of perceived Helpfulness and Hopefulness regarding the current encounter, and the results and are recorded below. The results were collaboratively discussed to make adjustments or improvements in subsequent psychotherapy encounters.      Degree of helpfulness of this visit on a scale ranging from 0 is Not Helpful to 10 is Very Helpful - Historical to Current: Did not review  Degree of hopefulness in improving mental health and wellbeing on a scale ranging from 0 Not Hopeful to 10 Very Hopeful - Historical to Current: Did not review    The plan is for Yanelis to follow up with this provider for outpatient psychotherapy and will seek additional recommended services as indicated to continue work on treatment goals.     If there are any questions regarding this information please contact the [facility] at [phone].    David Vega, PhD, LP   Professor of Psychiatry and Behavioral Sciences  [facility]

## 2024-07-25 ENCOUNTER — VIRTUAL VISIT (OUTPATIENT)
Dept: PSYCHIATRY | Facility: CLINIC | Age: 49
End: 2024-07-25
Attending: PSYCHOLOGIST
Payer: COMMERCIAL

## 2024-07-25 DIAGNOSIS — F33.0 MILD EPISODE OF RECURRENT MAJOR DEPRESSIVE DISORDER (H): ICD-10-CM

## 2024-07-25 DIAGNOSIS — F41.9 ANXIETY DISORDER, UNSPECIFIED TYPE: ICD-10-CM

## 2024-07-25 DIAGNOSIS — F90.0 ATTENTION DEFICIT HYPERACTIVITY DISORDER (ADHD), PREDOMINANTLY INATTENTIVE TYPE: Primary | ICD-10-CM

## 2024-07-25 PROCEDURE — 90837 PSYTX W PT 60 MINUTES: CPT | Mod: 95 | Performed by: PSYCHOLOGIST

## 2024-08-01 ENCOUNTER — VIRTUAL VISIT (OUTPATIENT)
Dept: PSYCHIATRY | Facility: CLINIC | Age: 49
End: 2024-08-01
Attending: PSYCHOLOGIST
Payer: COMMERCIAL

## 2024-08-01 DIAGNOSIS — F90.0 ATTENTION DEFICIT HYPERACTIVITY DISORDER (ADHD), PREDOMINANTLY INATTENTIVE TYPE: Primary | ICD-10-CM

## 2024-08-01 DIAGNOSIS — F41.9 ANXIETY DISORDER, UNSPECIFIED TYPE: ICD-10-CM

## 2024-08-01 DIAGNOSIS — F33.0 MILD EPISODE OF RECURRENT MAJOR DEPRESSIVE DISORDER (H): ICD-10-CM

## 2024-08-01 PROCEDURE — 90837 PSYTX W PT 60 MINUTES: CPT | Mod: 95 | Performed by: PSYCHOLOGIST

## 2024-08-02 NOTE — PROGRESS NOTES
[facility]  [address], [address]  [place] [address]  [phone]     OUTPATIENT PSYCHOTHERAPY ENCOUNTER      PATIENT     Name: Yanelis Lyons  YOB: 1975     SERVICES PROVIDED    Date of Service: 7/25/2024   Time of Service: 205 to 307 pm (62 Minutes)   Type of Service: 66985 Individual Psychotherapy (53+ min)   Service Provider: David Vega, PhD, LP     TELEMEDICINE ENCOUNTER METHODS     Telemedicine psychotherapy was conducted due to the preference Yanelis during scheduling. The patient's condition was safely assessed and treated via synchronous audio and visual telemedicine encounter. A secure real time interactive audio and visual telecommunication system (videoconference via [employer]) was used for the visit.     Patient Visit Location: Home in [place]      Present For Encounter: Yanelis     Provider Visit Location: HIPAA compliant location within provider home       ENCOUNTER SYNOPSIS     Yanelis participated in a psychotherapy session today with David Vega, PhD, LP. Background information, history, current condition, and available medical record notes were reviewed, and a brief clinical interview was conducted with Yanelis to update the treatment focus and planning as indicated. There was opportunity for Yanelis to discuss and process recent life happenings. The session included providing Yanelis with an evidence-informed, strengths-based, whole-person mental health and wellbeing-focused model of psychotherapy (see Treatment Plan and Psychotherapy Interventions). Coordination of care was also planned with other healthcare providers working with Yanelis if indicated.     BACKGROUND INFORMATION (From 7/8/2024 Intake; Updated 7/18/2024)      Yanelis lives with her father and brother.  She has a BA degree in physics.  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    Yanelis is currently  "unemployed and her more recent work history is \"spotty.\"  She has reportedly been fired from many jobs due to ADHD symptoms.  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    Yanelis was diagnosed with breast cancer in 2022.  She was successfully treated with chemotherapy, mastectomy, and radiation.    Yanelis is receiving mental health care.  She receives psychiatric care from Dr. Leora Driscoll.  Her diagnosis is reported to be ADHD and she is currently on Adderall.  In addition, she participates in ADHD support groups.    Currently, Yanelis is seeking ADHD informed psychotherapy with the current provider.  Her goal is to become \"reliable and productive again\" and learn how to deal with her executive functioning challenges.    Psychiatric Symptoms         The following symptoms and concerns are to be targeted in the treatment of Yanelis.      Inattention/Hyperactivity/Impulsivity: Reported she was diagnosed with ADHD at the age of 34; even though she is on Adderall, Yanelis reported current symptoms of inattention and to some degree also hyperactivity/impulsivity; in particular she gets overwhelmed with work/tasks and shuts down; indicated her executive functioning is poor and especially being disorganized, having a hard time staying focused, and getting frustrated a lot   Depression: Reported mild recurrent depressive symptoms including recently sadness, irritability, decreased energy, low motivation, poor concentration, sleep disturbance, negative thoughts    Hypomania/Josephine: None reported    Anxiety: Reported recurrent symptoms of worrying, ruminating, nervousness, heightened physical tension, avoidance     Panic: None reported   Obsessions/Compulsions: None reported   Post-Traumatic Stress: None reported   Sleep Problems and Fatigue: Reported her " "sleep is a \"mess\" since being treated for cancer with side effects coming from the many medications she has been on; noted she uses trazodone intermittently for sleep as needed; had 2 prior sleep studies and was determined to be \"normal\" without apnea or restlessness  Alcohol Abuse: None reported   Drug Abuse: None reported     Psychotic: None reported     Suicidal Thoughts: None reported recently; had suicidal ideation as a teenager    Cutting/Self-Injury: None reported         Questionnaire Data (Submitted on 7/7/2024)    PHQ9 Total Score: 9 (mild depression)     Functional Concerns      The following functional concerns are to be targeted in the treatment of Yanelis.      Home: Reported struggling with household tasks and upkeep      School: Reported having difficulty being organized and completing homework and academic tasks in high school and college      Work: Reported is distractible, off task, forgets things, etc. on the job and she has been fired from several jobs as a result    Financial: Reported prior history of significant credit card debt due to overspending        History     The following reflect pertinent historical findings identified for Yanelis.      Developmental Delays: None reported    Family Problems: Reported that her mother was impatient with her growing up and often yelled at her; indicated that her mother passed away in 2021 from a lung disease; observed that her mother was \"the center of the family\" and the family is struggling without her    Child Abuse/Neglect/Trauma: None reported    Educational Problems: None reported    Mental Health Problems: Reported had undiagnosed depression during her teenage years and that she was officially diagnosed while she attended college; noted she has been struggling with recurrent depressive symptoms throughout adulthood    Physical Health Problems: Reported prior history of breast cancer that was successfully treated and is currently in " remission; noted recently being diagnosed with postural orthostatic tachycardia syndrome (POTS)  Family History of Mental Health Problems: Reported suspicion that her father has ADHD but this has not been officially diagnosed; believes her brother is depressed     Other Problems: None reported      Strengths and Protective Factors      The following reflect strengths and protective factors that could be leveraged in treatment of Yanelis.      Personal: Reported she is persistent and that she is good with knitting, sewing, cross stitching, and painting     Support System: Reported to be her father and brother      CHECK-IN FOR THIS ENCOUNTER     Recent Happenings     Checked in with Yanelis to review how it has been going recently     Noteworthy Events: Reported routine life events  Noteworthy Stressors/Problems: Reported stress related to several medical appointments she has had        Recent Health Concerns     Checked in with Yanelis to review current health concerns and rated them ranging from 0 = Currently Low Concern to 10 = Currently Very High Concern.      Emotional Health Concerns: Current level of concern about overall distress, anxiety, depression, or stress - Historical to Current: Moderate; moderate  Notes: Reported stress and feeling overwhelmed including some depression     Physical Health Concerns: Current level of concern about overall physical health, physical activity, body weight, health habits, or pain - Historical to Current: Moderate; moderate  Notes: Reported feeling generally healthy but acknowledges some dizzy episodes that could be stress related  Living Health Concerns: Current level of concern about functioning at home, at school and/or work, and in important relationships - Historical to Current: NA  Notes: Did not review    Recent Safety Concerns    Self: None reported   Others: None reported       Desired Focus for Encounter     Checked in with Yanelis to determine any desired  goals, topics, skills, or habits of focus for this session.      Areas of Focus: None reported      TREATMENT PLAN AND PSYCHOTHERAPY UPDATE FOR THIS ENCOUNTER     Diagnoses Being Treated      Yanelis qualifies for diagnoses of ADHD-I; major depression, recurrent, mild; anxiety disorder, unspecified     Treatment Symptoms Goals Addressed and Progress      Based on the Background Information above, the following personalized psychiatric symptom domains for Yanelis are targeted for improvement through psychotherapy. The goal is to achieve an 8 or higher over the course of treatment. Progress to date ranges from 0 Limited to 5 Some to 10 Significant. This progress is episodically reviewed with Yanelis to make treatment decisions.     Improve Understanding of Condition/Illness: Baseline estimated to be 6  Reduce Inattention: Baseline estimated to be 5  Reduce Depression: Baseline estimated to be 5  Reduce Anxiety: Baseline estimated to be 6  Reduce Sleep Problems and Fatigue: Baseline estimated to be 5    Functional Goals Addressed and Progress      Based on the Background Information above, the following personalized functional domains for Yanelis are targeted for improvement through psychotherapy. The goal is to achieve an 8 or higher over the course of treatment. Progress to date ranges from 0 Limited to 5 Some to 10 Significant. This progress is episodically reviewed with Yanelis to make treatment decisions.     Home: Baseline estimated to be 5  School: NA currently  Work: Baseline estimated to be 4  Financial: NA currently    Mental Health and Wellbeing Health Practices Addressed and Progress     Using a brief psychotherapy collaborative decision aid, Yanelis was guided to make self-ratings for perceived level of mastery in 12 Health Practices that are aligned with Four Worlds of Mental Health and Wellbeing. Ratings range from 0 = Not Good at This to 10 = Great at This. Ratings below were recorded on  7/8/2024.      Internal World: Regulation of Emotions, Thoughts, and Stress   Soothe Health Practice: I am calm and manage my emotions - Historical to Current: 5.5  Think Health Practice: I have positive, hopeful and helpful thoughts - Historical to Current: 5 (indicated being self-critical)  Rest Health Practice: I am well rested and energized - Historical to Current: 4    Physical World: Physical Health      Hydrate Health Practice: I drink enough water every day and stay hydrated - Historical to Current: 6  Nourish Health Practice: I eat healthy - Historical to Current: 4 (indicated does not eat breakfast)  Move Health Practice: I am physically active - Historical to Current: 2    External World: Social Relationships and Productive Behaviors   Express Health Practice: I listen to others and express myself well  - Historical to Current: 8  Connect Health Practice: I have positive, healthy and meaningful relationships - Historical to Current: 7  Activate Health Practice: I am responsible and successful at home, work, school - Historical to Current: 3      Spiritual World: Contemplation, Purpose and Meaning, and Living According to Beliefs   Observe Health Practice: I am thankful, accepting, and live in the moment - Historical to Current: 7.5  Seek Health Practice: I do things that give me meaning and purpose - Historical to Current: 7.5  Believe Health Practice: My values and beliefs guide how I am living - Historical to Current: 7.5    Psychotherapy Interventions Provided to Improve Symptoms, Functioning, and Mental Health and Wellbeing      The first part of the session focused on processing medical trauma events she has been through.  She has been triggered by a recent recommendation by her doctor to get a lumbar puncture procedure.  Supported and validated Yanleis for traumatic experiences and how it impacts her still today.      She also discussed her decision to not have children.  She indicated she really  could not because of her social situation and later because of medical problems.  Nonetheless she feels some sadness and disappointment.  She is a little envious of a friend who has children.  Again supported and validated Yanelis for these thoughts and feelings.    This session also focused on introducing Yanelis to a strengths-based, whole-person mental health and wellbeing-focused model of psychotherapy. This approach incorporates empirically validated practice elements derived from cognitive behavioral therapy, acceptance and commitment therapy, positive psychology, mindfulness, and habit formation science. Specific psychotherapeutic modules within the model are personalized to help Yanelis work on and make progress with treatment goals. Culturally and trauma informed care principles are incorporated to contextualize therapeutic strategies to best fit the life experiences and views of Yanelis as indicated.     Based on above ratings and patient preference, Yanelis was guided to work on personalized psychotherapeutic modules within identified Health Practices that are italicized below to improve daily functioning and overall mental health and wellbeing. Instruction, training, guidance, along with traditional therapeutic processing, is used to promote growth in these areas for Yanelis.      Move Health Practice: Being Physically Active  Be Active Module: Being more active each day (habit formation and positive psychology)  Be Strong and Mobile Module: Building your strength, flexibility, and balance (habit formation and positive psychology)  Be Fit Module: Building your physical fitness (habit formation and positive psychology)   Notes: Reviewed and reinforced Yanelis for working on being more active and less sedentary; she plans to work on walking and setting up a schedule to do so 3 to 4 days a week at the mall    Get Ready and Take Action: Promoting and Guiding Change  Form a Time-Based Habit:  Leveraging habit loop at a specified time; At [time cue], I will [action], to gain [reward] (e.g., At 7 am, I will walk 2 miles, to feel better physically and emotionally)    Notes: Reviewed first Yanelis for working on her habits; she noted her goal is to develop habits for exercising in the short-term and eventually for meditating, sleeping, and having calm her thoughts; discussed and processed principles of habit psychology and how to achieve her goals accordingly    MENTAL STATUS EXAM     Yanelis was observed for the following indicators of mental status.     Appearance/Behavior: Adequate grooming, appropriate attire, good eye contact, calm, cooperative, no abnormal movements   Speech: Rate, volume and tone appropriate; no push or pressure; no rapid speech     Mood/Affect: Calm, affect congruent to situation   Thought Process/Content: Coherent, linear; no reports or evidence of auditory hallucinations; no reports or evidence of visual hallucinations    Thought Associations: Logical, no looseness of associations, no flight of ideas, no tangentially, no circumstantiality   Insight/Judgment: Adequate insight into condition and need for treatment; judgement not impaired   Orientation: Alert and oriented to person place, time, and circumstance   Recent and Past Memory: Good    Attention Span/Concentration: Good     Language: English with estimated vocabulary to be average or above   Fund of Knowledge: Estimated to be average or above fund of knowledge      SAFETY PLAN - NA, no SI or SIB     RECOMMENDATIONS     Based on a medical records review and the results of this encounter, it is recommended that Yanelis participate in the following behavioral health services.      Mental Health Outpatient Psychotherapy: Individual-focused skills and habit training with therapeutic processing to improve mental health and wellbeing, conducted by Dr. Vega   Psychiatric Evaluation and Services: Continue consulting with  Dr. Driscoll for psychiatric care including psychiatric diagnostic clarification, medication management, care coordination, and psychotherapy as needed    Primary and Specialty Care Services: Continue consulting with specialty care providers for cancer and PCP for healthcare, medication management, and care coordination as needed        DISPOSITION AND PLAN      Psychotherapy was conducted with Yanelis to make progress on treatment goals, enhance daily functioning, and improve mental health and wellbeing. Motivational interviewing was used to promote insight and engagement and to personalize psychotherapy interventions. If applicable, a safety plan was formulated and reviewed with Yanelis.      The engagement of Yanelis during the session is assessed by this provider as: High     The progress of Etienne on treatment plan goals and objectives is assessed by this provider as: Moderate       In line with feedback informed therapy, Yanelis was guided to complete ratings of perceived Helpfulness and Hopefulness regarding the current encounter, and the results and are recorded below. The results were collaboratively discussed to make adjustments or improvements in subsequent psychotherapy encounters.      Degree of helpfulness of this visit on a scale ranging from 0 is Not Helpful to 10 is Very Helpful - Historical to Current: Did not review  Degree of hopefulness in improving mental health and wellbeing on a scale ranging from 0 Not Hopeful to 10 Very Hopeful - Historical to Current: Did not review    The plan is for Yanelis to follow up with this provider for outpatient psychotherapy and will seek additional recommended services as indicated to continue work on treatment goals.     If there are any questions regarding this information please contact the [facility] at [phone].    David Vega, PhD, LP   Professor of Psychiatry and Behavioral Sciences  [facility]

## 2024-08-03 NOTE — PROGRESS NOTES
[facility]  [address], [address]  [place] [address]  [phone]     OUTPATIENT PSYCHOTHERAPY ENCOUNTER      PATIENT     Name: Yanelis Lyons  YOB: 1975     SERVICES PROVIDED    Date of Service: 8/1/2024   Time of Service: 1004 to 1116 am (71 Minutes)   Type of Service: 48437 Individual Psychotherapy (53+ min)   Service Provider: David Vega, , LP     TELEMEDICINE ENCOUNTER METHODS     Telemedicine psychotherapy was conducted due to the preference Yanelis during scheduling. The patient's condition was safely assessed and treated via synchronous audio and visual telemedicine encounter. A secure real time interactive audio and visual telecommunication system (videoconference via [employer]) was used for the visit.     Patient Visit Location: Home in [place]      Present For Encounter: Yanelis     Provider Visit Location: HIPAA compliant location within provider home       ENCOUNTER SYNOPSIS     Yanelis participated in a psychotherapy session today with David Vega, PhD, LP. Background information, history, current condition, and available medical record notes were reviewed, and a brief clinical interview was conducted with Yanelis to update the treatment focus and planning as indicated. There was opportunity for Yanelis to discuss and process recent life happenings. The session included providing Yanelis with an evidence-informed, strengths-based, whole-person mental health and wellbeing-focused model of psychotherapy (see Treatment Plan and Psychotherapy Interventions). Coordination of care was also planned with other healthcare providers working with Yanelis if indicated.     BACKGROUND INFORMATION (From 7/8/2024 Intake; Updated 7/18/2024)      Yanelis lives with her father and brother.  She has a BA degree in physics.  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    Yanelis is currently  "unemployed and her more recent work history is \"spotty.\"  She has reportedly been fired from many jobs due to ADHD symptoms.  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    Yanelis was diagnosed with breast cancer in 2022.  She was successfully treated with chemotherapy, mastectomy, and radiation.    Yanelis is receiving mental health care.  She receives psychiatric care from Dr. Leora Driscoll.  Her diagnosis is reported to be ADHD and she is currently on Adderall.  In addition, she participates in ADHD support groups.    Currently, Yanelis is seeking ADHD informed psychotherapy with the current provider.  Her goal is to become \"reliable and productive again\" and learn how to deal with her executive functioning challenges.    Psychiatric Symptoms         The following symptoms and concerns are to be targeted in the treatment of Yanelis.      Inattention/Hyperactivity/Impulsivity: Reported she was diagnosed with ADHD at the age of 34; even though she is on Adderall, Yanelis reported current symptoms of inattention and to some degree also hyperactivity/impulsivity; in particular she gets overwhelmed with work/tasks and shuts down; indicated her executive functioning is poor and especially being disorganized, having a hard time staying focused, and getting frustrated a lot   Depression: Reported mild recurrent depressive symptoms including recently sadness, irritability, decreased energy, low motivation, poor concentration, sleep disturbance, negative thoughts    Hypomania/Josephine: None reported    Anxiety: Reported recurrent symptoms of worrying, ruminating, nervousness, heightened physical tension, avoidance     Panic: None reported   Obsessions/Compulsions: None reported   Post-Traumatic Stress: None reported   Sleep Problems and Fatigue: Reported her " "sleep is a \"mess\" since being treated for cancer with side effects coming from the many medications she has been on; noted she uses trazodone intermittently for sleep as needed; had 2 prior sleep studies and was determined to be \"normal\" without apnea or restlessness  Alcohol Abuse: None reported   Drug Abuse: None reported     Psychotic: None reported     Suicidal Thoughts: None reported recently; had suicidal ideation as a teenager    Cutting/Self-Injury: None reported         Questionnaire Data (Submitted on 7/7/2024)    PHQ9 Total Score: 9 (mild depression)     Functional Concerns      The following functional concerns are to be targeted in the treatment of Yanelis.      Home: Reported struggling with household tasks and upkeep      School: Reported having difficulty being organized and completing homework and academic tasks in high school and college      Work: Reported is distractible, off task, forgets things, etc. on the job and she has been fired from several jobs as a result    Financial: Reported prior history of significant credit card debt due to overspending        History     The following reflect pertinent historical findings identified for Yanelis.      Developmental Delays: None reported    Family Problems: Reported that her mother was impatient with her growing up and often yelled at her; indicated that her mother passed away in 2021 from a lung disease; observed that her mother was \"the center of the family\" and the family is struggling without her    Child Abuse/Neglect/Trauma: None reported    Educational Problems: None reported    Mental Health Problems: Reported had undiagnosed depression during her teenage years and that she was officially diagnosed while she attended college; noted she has been struggling with recurrent depressive symptoms throughout adulthood    Physical Health Problems: Reported prior history of breast cancer that was successfully treated and is currently in " remission; noted recently being diagnosed with postural orthostatic tachycardia syndrome (POTS)  Family History of Mental Health Problems: Reported suspicion that her father has ADHD but this has not been officially diagnosed; believes her brother is depressed     Other Problems: None reported      Strengths and Protective Factors      The following reflect strengths and protective factors that could be leveraged in treatment of Yanelis.      Personal: Reported she is persistent and that she is good with knitting, sewing, cross stitching, and painting     Support System: Reported to be her father and brother      CHECK-IN FOR THIS ENCOUNTER     Recent Happenings     Checked in with Yanelis to review how it has been going recently     Noteworthy Events: Reported routine life events  Noteworthy Stressors/Problems: Reported stress related to supporting a best friend who is going through a grieving process and that it is stressful to meet her emotional needs         Recent Health Concerns     Checked in with Yanelis to review current health concerns and rated them ranging from 0 = Currently Low Concern to 10 = Currently Very High Concern.      Emotional Health Concerns: Current level of concern about overall distress, anxiety, depression, or stress - Historical to Current: Moderate; moderate; moderate  Notes: Reported stress and some depression     Physical Health Concerns: Current level of concern about overall physical health, physical activity, body weight, health habits, or pain - Historical to Current: Moderate; moderate; moderate  Notes: Reported feeling generally healthy but acknowledges poor sleep lately   Living Health Concerns: Current level of concern about functioning at home, at school and/or work, and in important relationships - Historical to Current: NA  Notes: Did not review    Recent Safety Concerns    Self: None reported   Others: None reported       Desired Focus for Encounter     Checked in  with Yanelis to determine any desired goals, topics, skills, or habits of focus for this session.      Areas of Focus: None reported      TREATMENT PLAN AND PSYCHOTHERAPY UPDATE FOR THIS ENCOUNTER     Diagnoses Being Treated      Yanelis qualifies for diagnoses of ADHD-I; major depression, recurrent, mild; anxiety disorder, unspecified     Treatment Symptoms Goals Addressed and Progress      Based on the Background Information above, the following personalized psychiatric symptom domains for Yanelis are targeted for improvement through psychotherapy. The goal is to achieve an 8 or higher over the course of treatment. Progress to date ranges from 0 Limited to 5 Some to 10 Significant. This progress is episodically reviewed with Yanelis to make treatment decisions.     Improve Understanding of Condition/Illness: Baseline estimated to be 6  Reduce Inattention: Baseline estimated to be 5  Reduce Depression: Baseline estimated to be 5  Reduce Anxiety: Baseline estimated to be 6  Reduce Sleep Problems and Fatigue: Baseline estimated to be 5    Functional Goals Addressed and Progress      Based on the Background Information above, the following personalized functional domains for Yanelis are targeted for improvement through psychotherapy. The goal is to achieve an 8 or higher over the course of treatment. Progress to date ranges from 0 Limited to 5 Some to 10 Significant. This progress is episodically reviewed with Yanelis to make treatment decisions.     Home: Baseline estimated to be 5  School: NA currently  Work: Baseline estimated to be 4  Financial: NA currently    Mental Health and Wellbeing Health Practices Addressed and Progress     Using a brief psychotherapy collaborative decision aid, Yanelis was guided to make self-ratings for perceived level of mastery in 12 Health Practices that are aligned with Four Worlds of Mental Health and Wellbeing. Ratings range from 0 = Not Good at This to 10 = Great at  "This. Ratings below were recorded on 7/8/2024.      Internal World: Regulation of Emotions, Thoughts, and Stress   Soothe Health Practice: I am calm and manage my emotions - Historical to Current: 5.5  Think Health Practice: I have positive, hopeful and helpful thoughts - Historical to Current: 5 (indicated being self-critical)  Rest Health Practice: I am well rested and energized - Historical to Current: 4    Physical World: Physical Health      Hydrate Health Practice: I drink enough water every day and stay hydrated - Historical to Current: 6  Nourish Health Practice: I eat healthy - Historical to Current: 4 (indicated does not eat breakfast)  Move Health Practice: I am physically active - Historical to Current: 2    External World: Social Relationships and Productive Behaviors   Express Health Practice: I listen to others and express myself well  - Historical to Current: 8  Connect Health Practice: I have positive, healthy and meaningful relationships - Historical to Current: 7  Activate Health Practice: I am responsible and successful at home, work, school - Historical to Current: 3      Spiritual World: Contemplation, Purpose and Meaning, and Living According to Beliefs   Observe Health Practice: I am thankful, accepting, and live in the moment - Historical to Current: 7.5  Seek Health Practice: I do things that give me meaning and purpose - Historical to Current: 7.5  Believe Health Practice: My values and beliefs guide how I am living - Historical to Current: 7.5    Psychotherapy Interventions Provided to Improve Symptoms, Functioning, and Mental Health and Wellbeing      The first part of the session focused on processing family of origin issues.  She recalled having numerous invalidating interactions with her mother growing up.  Through that she seemed to have internalized a self view of being \"lazy\" and \"broken.\"  Discussed how these core beliefs can impact how people interpret information day-to-day.  " Reviewed how these unhelpful beliefs and thoughts negatively impact her ability to succeed in life.  Supported and validated Yanelis for her family of origin issues and how it impacts her still today.      This session also focused on introducing Yanelis to a strengths-based, whole-person mental health and wellbeing-focused model of psychotherapy. This approach incorporates empirically validated practice elements derived from cognitive behavioral therapy, acceptance and commitment therapy, positive psychology, mindfulness, and habit formation science. Specific psychotherapeutic modules within the model are personalized to help Yanelis work on and make progress with treatment goals. Culturally and trauma informed care principles are incorporated to contextualize therapeutic strategies to best fit the life experiences and views of Yanelis as indicated.     Based on above ratings and patient preference, Yanelis was guided to work on personalized psychotherapeutic modules within identified Health Practices that are italicized below to improve daily functioning and overall mental health and wellbeing. Instruction, training, guidance, along with traditional therapeutic processing, is used to promote growth in these areas for Yanelis.      Move Health Practice: Being Physically Active  Be Active Module: Being more active each day (habit formation and positive psychology)  Be Strong and Mobile Module: Building your strength, flexibility, and balance (habit formation and positive psychology)  Be Fit Module: Building your physical fitness (habit formation and positive psychology)   Notes: Reviewed and reinforced Yanelis for working on being more active and less sedentary; unfortunately she has been feeling tired and lethargic lately and was unable to work on her plan; discussed how she might eventually work on her plan of walking according to a schedule of 3 to 4 days a week; also noted that she could do  stretching exercises consistent with her POTS routines     Activate Health Practice: Being Responsible and Successful at Home, Work, and/or School  Conquer Avoidance Module: Avoiding less and actively doing more of what matters each day (behavioral activation and cognitive behavioral therapy)   Accomplish More Module: Doing what needs to be done each day (executive functioning skills, behavioral activation, and habit formation)  Solve Problems Module: Overcome everyday problems to be more successful in what matters to you (executive functioning skills and cognitive behavioral therapy)  Notes: Introduced, reviewed, and planned for how Yanelis can utilize avoiding less and behavioral activation strategies; she noted her recent avoidance coping rating was 8 and active coping rating was 5; discussed behavioral activation strategies, she noted being in active playing games, being on line, etc. which she estimates to be 8 to 10 hours a day currently; may plan that for every two hours of inactivation she would engage in one hour of activation related to getting things done around the house, exercise, etc.    Get Ready and Take Action: Promoting and Guiding Change  Form a Time-Based Habit: Leveraging habit loop at a specified time; At [time cue], I will [action], to gain [reward] (e.g., At 7 am, I will walk 2 miles, to feel better physically and emotionally)    Notes: Reviewed first Yanelis for working on her habits; she noted her goal is to develop habits for exercising in the short-term and eventually for meditating, sleeping, and having calm her thoughts; discussed and processed principles of habit psychology and how to achieve her goals accordingly    MENTAL STATUS EXAM     Yanelis was observed for the following indicators of mental status.     Appearance/Behavior: Adequate grooming, appropriate attire, good eye contact, calm, cooperative, no abnormal movements   Speech: Rate, volume and tone appropriate; no push  or pressure; no rapid speech     Mood/Affect: Calm, affect congruent to situation   Thought Process/Content: Coherent, linear; no reports or evidence of auditory hallucinations; no reports or evidence of visual hallucinations    Thought Associations: Logical, no looseness of associations, no flight of ideas, no tangentially, no circumstantiality   Insight/Judgment: Adequate insight into condition and need for treatment; judgement not impaired   Orientation: Alert and oriented to person place, time, and circumstance   Recent and Past Memory: Good    Attention Span/Concentration: Good     Language: English with estimated vocabulary to be average or above   Fund of Knowledge: Estimated to be average or above fund of knowledge      SAFETY PLAN - NA, no SI or SIB     RECOMMENDATIONS     Based on a medical records review and the results of this encounter, it is recommended that Yanelis participate in the following behavioral health services.      Mental Health Outpatient Psychotherapy: Individual-focused skills and habit training with therapeutic processing to improve mental health and wellbeing, conducted by Dr. Vega   Psychiatric Evaluation and Services: Continue consulting with Dr. Driscoll for psychiatric care including psychiatric diagnostic clarification, medication management, care coordination, and psychotherapy as needed    Primary and Specialty Care Services: Continue consulting with specialty care providers for cancer and PCP for healthcare, medication management, and care coordination as needed        DISPOSITION AND PLAN      Psychotherapy was conducted with Yanelis to make progress on treatment goals, enhance daily functioning, and improve mental health and wellbeing. Motivational interviewing was used to promote insight and engagement and to personalize psychotherapy interventions. If applicable, a safety plan was formulated and reviewed with Yanelis.      The engagement of Yanelis during the  session is assessed by this provider as: High     The progress of Etienne on treatment plan goals and objectives is assessed by this provider as: Moderate       In line with feedback informed therapy, Yanelis was guided to complete ratings of perceived Helpfulness and Hopefulness regarding the current encounter, and the results and are recorded below. The results were collaboratively discussed to make adjustments or improvements in subsequent psychotherapy encounters.      Degree of helpfulness of this visit on a scale ranging from 0 is Not Helpful to 10 is Very Helpful - Historical to Current: Did not review  Degree of hopefulness in improving mental health and wellbeing on a scale ranging from 0 Not Hopeful to 10 Very Hopeful - Historical to Current: Did not review    The plan is for Yanelis to follow up with this provider for outpatient psychotherapy and will seek additional recommended services as indicated to continue work on treatment goals.     If there are any questions regarding this information please contact the [facility] at [phone].    David Vega, PhD, LP   Professor of Psychiatry and Behavioral Sciences  [facility]

## 2024-08-22 ENCOUNTER — VIRTUAL VISIT (OUTPATIENT)
Dept: PSYCHIATRY | Facility: CLINIC | Age: 49
End: 2024-08-22
Attending: PSYCHOLOGIST
Payer: COMMERCIAL

## 2024-08-22 DIAGNOSIS — F41.9 ANXIETY DISORDER, UNSPECIFIED TYPE: ICD-10-CM

## 2024-08-22 DIAGNOSIS — F33.0 MILD EPISODE OF RECURRENT MAJOR DEPRESSIVE DISORDER (H): ICD-10-CM

## 2024-08-22 DIAGNOSIS — F90.0 ATTENTION DEFICIT HYPERACTIVITY DISORDER (ADHD), PREDOMINANTLY INATTENTIVE TYPE: Primary | ICD-10-CM

## 2024-08-22 PROCEDURE — 90837 PSYTX W PT 60 MINUTES: CPT | Mod: 95 | Performed by: PSYCHOLOGIST

## 2024-08-22 ASSESSMENT — PATIENT HEALTH QUESTIONNAIRE - PHQ9
SUM OF ALL RESPONSES TO PHQ QUESTIONS 1-9: 14
10. IF YOU CHECKED OFF ANY PROBLEMS, HOW DIFFICULT HAVE THESE PROBLEMS MADE IT FOR YOU TO DO YOUR WORK, TAKE CARE OF THINGS AT HOME, OR GET ALONG WITH OTHER PEOPLE: VERY DIFFICULT
SUM OF ALL RESPONSES TO PHQ QUESTIONS 1-9: 14

## 2024-08-22 NOTE — PROGRESS NOTES
[facility]  [address], [address]  [place] [address]  [phone]     OUTPATIENT PSYCHOTHERAPY ENCOUNTER      PATIENT     Name: Yanelis Lyons  YOB: 1975     SERVICES PROVIDED    Date of Service: 8/22/2024   Time of Service: 208 to 304 pm (56 Minutes)   Type of Service: 75475 Individual Psychotherapy (53+ min)   Service Provider: David Vega, PhD, LP     TELEMEDICINE ENCOUNTER METHODS     Telemedicine psychotherapy was conducted due to the preference Yanelis during scheduling. The patient's condition was safely assessed and treated via synchronous audio and visual telemedicine encounter. A secure real time interactive audio and visual telecommunication system (videoconference via [employer]) was used for the visit.     Patient Visit Location: Home in [place]      Present For Encounter: Yanelis     Provider Visit Location: HIPAA compliant location within provider home       ENCOUNTER SYNOPSIS     Yanelis participated in a psychotherapy session today with David Vega, PhD, LP. Background information, history, current condition, and available medical record notes were reviewed, and a brief clinical interview was conducted with Yanelis to update the treatment focus and planning as indicated. There was opportunity for Yanelis to discuss and process recent life happenings. The session included providing Yanelis with an evidence-informed, strengths-based, whole-person mental health and wellbeing-focused model of psychotherapy (see Treatment Plan and Psychotherapy Interventions). Coordination of care was also planned with other healthcare providers working with Yanelis if indicated.     BACKGROUND INFORMATION (From 7/8/2024 Intake; Updated 7/18/2024)      Yanelis lives with her father and brother.  She has a BA degree in physics.  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    Yanelis is currently  "unemployed and her more recent work history is \"spotty.\"  She has reportedly been fired from many jobs due to ADHD symptoms.  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    Yanelis was diagnosed with breast cancer in 2022.  She was successfully treated with chemotherapy, mastectomy, and radiation.    Yanelis is receiving mental health care.  She receives psychiatric care from Dr. Leora Driscoll.  Her diagnosis is reported to be ADHD and she is currently on Adderall.  In addition, she participates in ADHD support groups.    Currently, Yanelis is seeking ADHD informed psychotherapy with the current provider.  Her goal is to become \"reliable and productive again\" and learn how to deal with her executive functioning challenges.    Psychiatric Symptoms         The following symptoms and concerns are to be targeted in the treatment of Yanelis.      Inattention/Hyperactivity/Impulsivity: Reported she was diagnosed with ADHD at the age of 34; even though she is on Adderall, Yanelis reported current symptoms of inattention and to some degree also hyperactivity/impulsivity; in particular she gets overwhelmed with work/tasks and shuts down; indicated her executive functioning is poor and especially being disorganized, having a hard time staying focused, and getting frustrated a lot   Depression: Reported mild recurrent depressive symptoms including recently sadness, irritability, decreased energy, low motivation, poor concentration, sleep disturbance, negative thoughts    Hypomania/Josephine: None reported    Anxiety: Reported recurrent symptoms of worrying, ruminating, nervousness, heightened physical tension, avoidance     Panic: None reported   Obsessions/Compulsions: None reported   Post-Traumatic Stress: None reported   Sleep Problems and Fatigue: Reported her " "sleep is a \"mess\" since being treated for cancer with side effects coming from the many medications she has been on; noted she uses trazodone intermittently for sleep as needed; had 2 prior sleep studies and was determined to be \"normal\" without apnea or restlessness  Alcohol Abuse: None reported   Drug Abuse: None reported     Psychotic: None reported     Suicidal Thoughts: None reported recently; had suicidal ideation as a teenager    Cutting/Self-Injury: None reported         Functional Concerns      The following functional concerns are to be targeted in the treatment of Yanelis.      Home: Reported struggling with household tasks and upkeep      School: Reported having difficulty being organized and completing homework and academic tasks in high school and college      Work: Reported is distractible, off task, forgets things, etc. on the job and she has been fired from several jobs as a result    Financial: Reported prior history of significant credit card debt due to overspending        History     The following reflect pertinent historical findings identified for Yanelis.      Developmental Delays: None reported    Family Problems: Reported that her mother was impatient with her growing up and often yelled at her; indicated that her mother passed away in 2021 from a lung disease; observed that her mother was \"the center of the family\" and the family is struggling without her    Child Abuse/Neglect/Trauma: None reported    Educational Problems: None reported    Mental Health Problems: Reported had undiagnosed depression during her teenage years and that she was officially diagnosed while she attended college; noted she has been struggling with recurrent depressive symptoms throughout adulthood    Physical Health Problems: Reported prior history of breast cancer that was successfully treated and is currently in remission; noted recently being diagnosed with postural orthostatic tachycardia syndrome " "(POTS)  Family History of Mental Health Problems: Reported suspicion that her father has ADHD but this has not been officially diagnosed; believes her brother is depressed     Other Problems: None reported      Strengths and Protective Factors      The following reflect strengths and protective factors that could be leveraged in treatment of Yanelis.      Personal: Reported she is persistent and that she is good with knitting, sewing, cross stitching, and painting     Support System: Reported to be her father and brother      CHECK-IN FOR THIS ENCOUNTER     Questionnaire Data (Submitted on 8/22/2024)    PHQ9 Total Score: 14 (moderate depression)     Recent Happenings     Checked in with Yanelis to review how it has been going recently     Noteworthy Events: Reported recently was approved for SSI disability and has received back payments  Noteworthy Stressors/Problems: Reported stress related to disagreements with her brother about how much money she should now start contributing to the household (she lives with her father and brother in a private home)         Recent Health Concerns     Checked in with Yanelis to review current health concerns and rated them ranging from 0 = Currently Low Concern to 10 = Currently Very High Concern.      Emotional Health Concerns: Current level of concern about overall distress, anxiety, depression, or stress - Historical to Current: Moderate; moderate; moderate; moderate to high  Notes: Reported stress and anxiety has \"ramped up\" recently      Physical Health Concerns: Current level of concern about overall physical health, physical activity, body weight, health habits, or pain - Historical to Current: Moderate; moderate; moderate  Notes: Reported feeling generally healthy but acknowledges fatigue and low energy    Living Health Concerns: Current level of concern about functioning at home, at school and/or work, and in important relationships - Historical to Current: " NA  Notes: Did not review    Recent Safety Concerns    Self: None reported   Others: None reported       TREATMENT PLAN AND PSYCHOTHERAPY UPDATE FOR THIS ENCOUNTER     Diagnoses Being Treated      Yanelis qualifies for diagnoses of ADHD-I; major depression, recurrent, mild; anxiety disorder, unspecified     Treatment Symptoms Goals Addressed and Progress      Based on the Background Information above, the following personalized psychiatric symptom domains for Yanelis are targeted for improvement through psychotherapy. The goal is to achieve an 8 or higher over the course of treatment. Progress to date ranges from 0 Limited to 5 Some to 10 Significant. This progress is episodically reviewed with Yanelis to make treatment decisions.     Improve Understanding of Condition/Illness: Baseline estimated to be 6  Reduce Inattention: Baseline estimated to be 5  Reduce Depression: Baseline estimated to be 5  Reduce Anxiety: Baseline estimated to be 6  Reduce Sleep Problems and Fatigue: Baseline estimated to be 5    Functional Goals Addressed and Progress      Based on the Background Information above, the following personalized functional domains for Yanelis are targeted for improvement through psychotherapy. The goal is to achieve an 8 or higher over the course of treatment. Progress to date ranges from 0 Limited to 5 Some to 10 Significant. This progress is episodically reviewed with Yanelis to make treatment decisions.     Home: Baseline estimated to be 5  School: NA currently  Work: Baseline estimated to be 4  Financial: NA currently    Mental Health and Wellbeing Health Practices Addressed and Progress     Using a brief psychotherapy collaborative decision aid, Yanelis was guided to make self-ratings for perceived level of mastery in 12 Health Practices that are aligned with Four Worlds of Mental Health and Wellbeing. Ratings range from 0 = Not Good at This to 10 = Great at This. Ratings below were recorded  on 7/8/2024.      Internal World: Regulation of Emotions, Thoughts, and Stress   Soothe Health Practice: I am calm and manage my emotions - Historical to Current: 5.5  Think Health Practice: I have positive, hopeful and helpful thoughts - Historical to Current: 5 (indicated being self-critical)  Rest Health Practice: I am well rested and energized - Historical to Current: 4    Physical World: Physical Health      Hydrate Health Practice: I drink enough water every day and stay hydrated - Historical to Current: 6  Nourish Health Practice: I eat healthy - Historical to Current: 4 (indicated does not eat breakfast)  Move Health Practice: I am physically active - Historical to Current: 2    External World: Social Relationships and Productive Behaviors   Express Health Practice: I listen to others and express myself well  - Historical to Current: 8  Connect Health Practice: I have positive, healthy and meaningful relationships - Historical to Current: 7  Activate Health Practice: I am responsible and successful at home, work, school - Historical to Current: 3      Spiritual World: Contemplation, Purpose and Meaning, and Living According to Beliefs   Observe Health Practice: I am thankful, accepting, and live in the moment - Historical to Current: 7.5  Seek Health Practice: I do things that give me meaning and purpose - Historical to Current: 7.5  Believe Health Practice: My values and beliefs guide how I am living - Historical to Current: 7.5    Psychotherapy Interventions Provided to Improve Symptoms, Functioning, and Mental Health and Wellbeing      The first part of the session focused on processing family family dynamics.  She noted how money complicates relationships with her brother especially.  Noted that there are poor boundaries among family members and she is working on being more assertive in the family context, especially with her brother.  Supported and validated Yanelis for dealing with these family  dynamics.  Encouraged her to communicate clearly and be assertive when necessary to maintain boundaries.        This session also focused on introducing Yanelis to a strengths-based, whole-person mental health and wellbeing-focused model of psychotherapy. This approach incorporates empirically validated practice elements derived from cognitive behavioral therapy, acceptance and commitment therapy, positive psychology, mindfulness, and habit formation science. Specific psychotherapeutic modules within the model are personalized to help Yanelis work on and make progress with treatment goals. Culturally and trauma informed care principles are incorporated to contextualize therapeutic strategies to best fit the life experiences and views of Yanelis as indicated.     Based on above ratings and patient preference, Yanelis was guided to work on personalized psychotherapeutic modules within identified Health Practices that are italicized below to improve daily functioning and overall mental health and wellbeing. Instruction, training, guidance, along with traditional therapeutic processing, is used to promote growth in these areas for Yanelis.      Move Health Practice: Being Physically Active  Be Active Module: Being more active each day (habit formation and positive psychology)  Be Strong and Mobile Module: Building your strength, flexibility, and balance (habit formation and positive psychology)  Be Fit Module: Building your physical fitness (habit formation and positive psychology)   Notes: Reviewed and reinforced Yanelis for working on being more active and less sedentary; unfortunately she still has not followed through with implementing her exercise plan; again reviewed fundamentals of behavioral activation and habit psychology related to scheduling and nudging herself to follow through with her plan, and to start with small goals and work up; discussed how confidence and motivation are improved once  someone gets started     Activate Health Practice: Being Responsible and Successful at Home, Work, and/or School  Conquer Avoidance Module: Avoiding less and actively doing more of what matters each day (behavioral activation and cognitive behavioral therapy)   Accomplish More Module: Doing what needs to be done each day (executive functioning skills, behavioral activation, and habit formation)  Solve Problems Module: Overcome everyday problems to be more successful in what matters to you (executive functioning skills and cognitive behavioral therapy)  Notes: Reviewed and reinforced Yanelis for awareness and redirecting avoiding to behavioral activation strategies; she pledged to use a schedule of cueing herself to exercise at 10 AM on Monday, Wednesday, and Friday and to implement this with a minimum routine for several minutes, and then build up with more minutes over time    MENTAL STATUS EXAM     Yanelis was observed for the following indicators of mental status.     Appearance/Behavior: Adequate grooming, appropriate attire, good eye contact, calm, cooperative, no abnormal movements   Speech: Rate, volume and tone appropriate; no push or pressure; no rapid speech     Mood/Affect: Calm, affect congruent to situation   Thought Process/Content: Coherent, linear; no reports or evidence of auditory hallucinations; no reports or evidence of visual hallucinations    Thought Associations: Logical, no looseness of associations, no flight of ideas, no tangentially, no circumstantiality   Insight/Judgment: Adequate insight into condition and need for treatment; judgement not impaired   Orientation: Alert and oriented to person place, time, and circumstance   Recent and Past Memory: Good    Attention Span/Concentration: Good     Language: English with estimated vocabulary to be average or above   Fund of Knowledge: Estimated to be average or above fund of knowledge      SAFETY PLAN - NA, no SI or SIB      RECOMMENDATIONS     Based on a medical records review and the results of this encounter, it is recommended that Yanelis participate in the following behavioral health services.      Mental Health Outpatient Psychotherapy: Individual-focused skills and habit training with therapeutic processing to improve mental health and wellbeing, conducted by Dr. Vega   Psychiatric Evaluation and Services: Continue consulting with Dr. Driscoll for psychiatric care including psychiatric diagnostic clarification, medication management, care coordination, and psychotherapy as needed    Primary and Specialty Care Services: Continue consulting with specialty care providers for cancer and PCP for healthcare, medication management, and care coordination as needed        DISPOSITION AND PLAN      Psychotherapy was conducted with Yanelis to make progress on treatment goals, enhance daily functioning, and improve mental health and wellbeing. Motivational interviewing was used to promote insight and engagement and to personalize psychotherapy interventions. If applicable, a safety plan was formulated and reviewed with Yanelis.      The engagement of Yanelis during the session is assessed by this provider as: High     The progress of Etienne on treatment plan goals and objectives is assessed by this provider as: Moderate       In line with feedback informed therapy, Yanelis was guided to complete ratings of perceived Helpfulness and Hopefulness regarding the current encounter, and the results and are recorded below. The results were collaboratively discussed to make adjustments or improvements in subsequent psychotherapy encounters.      Degree of helpfulness of this visit on a scale ranging from 0 is Not Helpful to 10 is Very Helpful - Historical to Current: Did not review  Degree of hopefulness in improving mental health and wellbeing on a scale ranging from 0 Not Hopeful to 10 Very Hopeful - Historical to Current:  Did not review    The plan is for Yanelis to follow up with this provider for outpatient psychotherapy and will seek additional recommended services as indicated to continue work on treatment goals.     If there are any questions regarding this information please contact the [facility] at [phone].    David Vega, PhD, LP   Professor of Psychiatry and Behavioral Sciences  [facility]

## 2024-08-29 ENCOUNTER — VIRTUAL VISIT (OUTPATIENT)
Dept: PSYCHIATRY | Facility: CLINIC | Age: 49
End: 2024-08-29
Attending: PSYCHOLOGIST
Payer: COMMERCIAL

## 2024-08-29 DIAGNOSIS — F90.0 ATTENTION DEFICIT HYPERACTIVITY DISORDER (ADHD), PREDOMINANTLY INATTENTIVE TYPE: Primary | ICD-10-CM

## 2024-08-29 DIAGNOSIS — F41.9 ANXIETY DISORDER, UNSPECIFIED TYPE: ICD-10-CM

## 2024-08-29 DIAGNOSIS — F33.0 MILD EPISODE OF RECURRENT MAJOR DEPRESSIVE DISORDER (H): ICD-10-CM

## 2024-08-29 PROCEDURE — 90837 PSYTX W PT 60 MINUTES: CPT | Mod: 95 | Performed by: PSYCHOLOGIST

## 2024-08-29 NOTE — PROGRESS NOTES
[facility]  [address], [address]  [place] [address]  [phone]     OUTPATIENT PSYCHOTHERAPY ENCOUNTER      PATIENT     Name: Yanelis Lyons  YOB: 1975     SERVICES PROVIDED    Date of Service: 8/29/2024   Time of Service: 211 to 305 pm (54 Minutes)   Type of Service: 74976 Individual Psychotherapy (53+ min)   Service Provider: David Vega, PhD, LP     TELEMEDICINE ENCOUNTER METHODS     Telemedicine psychotherapy was conducted due to the preference Yanelis during scheduling. The patient's condition was safely assessed and treated via synchronous audio and visual telemedicine encounter. A secure real time interactive audio and visual telecommunication system (videoconference via [employer]) was used for the visit.     Patient Visit Location: Home in [place]      Present For Encounter: Yanelis     Provider Visit Location: HIPAA compliant location within provider home       ENCOUNTER SYNOPSIS     Yanelis participated in a psychotherapy session today with David Vega, PhD, LP. Background information, history, current condition, and available medical record notes were reviewed, and a brief clinical interview was conducted with Yanelis to update the treatment focus and planning as indicated. There was opportunity for Yanelis to discuss and process recent life happenings. The session included providing Yanelis with an evidence-informed, strengths-based, whole-person mental health and wellbeing-focused model of psychotherapy (see Treatment Plan and Psychotherapy Interventions). Coordination of care was also planned with other healthcare providers working with Yanelis if indicated.     BACKGROUND INFORMATION (From 7/8/2024 Intake; Updated 7/18/2024)      Yanelis lives with her father and brother.  She has a BA degree in physics and a BA in English. 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    Yanelis is currently unemployed and her more recent work history is \"spotty.\"  She has reportedly been fired from many jobs due to ADHD symptoms.  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    Yanelis was diagnosed with breast cancer in 2022.  She was successfully treated with chemotherapy, mastectomy, and radiation.    Yanelis is receiving mental health care.  She receives psychiatric care from Dr. Leora Driscoll.  Her diagnosis is reported to be ADHD and she is currently on Adderall.  In addition, she participates in ADHD support groups.    Currently, Yanelis is seeking ADHD informed psychotherapy with the current provider.  Her goal is to become \"reliable and productive again\" and learn how to deal with her executive functioning challenges.    Psychiatric Symptoms         The following symptoms and concerns are to be targeted in the treatment of Yanelis.      Inattention/Hyperactivity/Impulsivity: Reported she was diagnosed with ADHD at the age of 34; even though she is on Adderall, Yanelis reported current symptoms of inattention and to some degree also hyperactivity/impulsivity; in particular she gets overwhelmed with work/tasks and shuts down; indicated her executive functioning is poor and especially being disorganized, having a hard time staying focused, and getting frustrated a lot   Depression: Reported mild recurrent depressive symptoms including recently sadness, irritability, decreased energy, low motivation, poor concentration, sleep disturbance, negative thoughts    Hypomania/Josephine: None reported    Anxiety: Reported recurrent symptoms of worrying, ruminating, nervousness, heightened physical tension, avoidance     Panic: None reported   Obsessions/Compulsions: None reported   Post-Traumatic Stress: None reported   Sleep " "Problems and Fatigue: Reported her sleep is a \"mess\" since being treated for cancer with side effects coming from the many medications she has been on; noted she uses trazodone intermittently for sleep as needed; had 2 prior sleep studies and was determined to be \"normal\" without apnea or restlessness  Alcohol Abuse: None reported   Drug Abuse: None reported     Psychotic: None reported     Suicidal Thoughts: None reported recently; had suicidal ideation as a teenager    Cutting/Self-Injury: None reported         Functional Concerns      The following functional concerns are to be targeted in the treatment of Yanelis.      Home: Reported struggling with household tasks and upkeep      School: Reported having difficulty being organized and completing homework and academic tasks in high school and college      Work: Reported is distractible, off task, forgets things, etc. on the job and she has been fired from several jobs as a result    Financial: Reported prior history of significant credit card debt due to overspending        History     The following reflect pertinent historical findings identified for Yanelis.      Developmental Delays: None reported    Family Problems: Reported that her mother was impatient with her growing up and often yelled at her; indicated that her mother passed away in 2021 from a lung disease; observed that her mother was \"the center of the family\" and the family is struggling without her    Child Abuse/Neglect/Trauma: None reported    Educational Problems: None reported    Mental Health Problems: Reported had undiagnosed depression during her teenage years and that she was officially diagnosed while she attended college; noted she has been struggling with recurrent depressive symptoms throughout adulthood    Physical Health Problems: Reported prior history of breast cancer that was successfully treated and is currently in remission; noted recently being diagnosed with postural " orthostatic tachycardia syndrome (POTS)  Family History of Mental Health Problems: Reported suspicion that her father has ADHD but this has not been officially diagnosed; believes her brother is depressed     Other Problems: None reported      Strengths and Protective Factors      The following reflect strengths and protective factors that could be leveraged in treatment of Yanelis.      Personal: Reported she is persistent and that she is good with knitting, sewing, cross stitching, and painting     Support System: Reported to be her father and brother      CHECK-IN FOR THIS ENCOUNTER     Questionnaire Data (Submitted on 8/29/2024)    PHQ9 Total Score: 14 (moderate depression)     Recent Happenings     Checked in with Yanelis to review how it has been going recently     Noteworthy Events: None reported   Noteworthy Stressors/Problems: None reported          Recent Health Concerns     Checked in with Yanelis to review current health concerns and rated them ranging from 0 = Currently Low Concern to 10 = Currently Very High Concern.      Emotional Health Concerns: Current level of concern about overall distress, anxiety, depression, or stress - Historical to Current: Moderate; moderate; moderate; moderate to high; moderate  Notes: Reported less stress and anxiety lately but still there at a mild level       Physical Health Concerns: Current level of concern about overall physical health, physical activity, body weight, health habits, or pain - Historical to Current: Moderate; moderate; moderate; moderate  Notes: Reported feeling generally healthy but again noted fatigue and low energy    Living Health Concerns: Current level of concern about functioning at home, at school and/or work, and in important relationships - Historical to Current: NA  Notes: Did not review    Recent Safety Concerns    Self: None reported   Others: None reported       TREATMENT PLAN AND PSYCHOTHERAPY UPDATE FOR THIS ENCOUNTER      Diagnoses Being Treated      Yanelis qualifies for diagnoses of ADHD-I; major depression, recurrent, mild; anxiety disorder, unspecified     Treatment Symptoms Goals Addressed and Progress      Based on the Background Information above, the following personalized psychiatric symptom domains for Yanelis are targeted for improvement through psychotherapy. The goal is to achieve an 8 or higher over the course of treatment. Progress to date ranges from 0 Limited to 5 Some to 10 Significant. This progress is episodically reviewed with Yanelis to make treatment decisions.     Improve Understanding of Condition/Illness: Baseline estimated to be 6; 7  Reduce Inattention: Baseline estimated to be 5; 6  Reduce Depression: Baseline estimated to be 5; 6  Reduce Anxiety: Baseline estimated to be 6; 6  Reduce Sleep Problems and Fatigue: Baseline estimated to be 5; 6    Functional Goals Addressed and Progress      Based on the Background Information above, the following personalized functional domains for Yanelis are targeted for improvement through psychotherapy. The goal is to achieve an 8 or higher over the course of treatment. Progress to date ranges from 0 Limited to 5 Some to 10 Significant. This progress is episodically reviewed with Yanelis to make treatment decisions.     Home: Baseline estimated to be 5; 6  School: NA currently  Work: Baseline estimated to be 4; NA currently  Financial: NA currently    Mental Health and Wellbeing Health Practices Addressed and Progress     Using a brief psychotherapy collaborative decision aid, Yanelis was guided to make self-ratings for perceived level of mastery in 12 Health Practices that are aligned with Four Worlds of Mental Health and Wellbeing. Ratings range from 0 = Not Good at This to 10 = Great at This. Ratings below were recorded on 7/8/2024.      Internal World: Regulation of Emotions, Thoughts, and Stress   Soothe Health Practice: I am calm and manage my  emotions - Historical to Current: 5.5  Think Health Practice: I have positive, hopeful and helpful thoughts - Historical to Current: 5 (indicated being self-critical)  Rest Health Practice: I am well rested and energized - Historical to Current: 4    Physical World: Physical Health      Hydrate Health Practice: I drink enough water every day and stay hydrated - Historical to Current: 6  Nourish Health Practice: I eat healthy - Historical to Current: 4 (indicated does not eat breakfast)  Move Health Practice: I am physically active - Historical to Current: 2    External World: Social Relationships and Productive Behaviors   Express Health Practice: I listen to others and express myself well  - Historical to Current: 8  Connect Health Practice: I have positive, healthy and meaningful relationships - Historical to Current: 7  Activate Health Practice: I am responsible and successful at home, work, school - Historical to Current: 3      Spiritual World: Contemplation, Purpose and Meaning, and Living According to Beliefs   Observe Health Practice: I am thankful, accepting, and live in the moment - Historical to Current: 7.5  Seek Health Practice: I do things that give me meaning and purpose - Historical to Current: 7.5  Believe Health Practice: My values and beliefs guide how I am living - Historical to Current: 7.5    Psychotherapy Interventions Provided to Improve Symptoms, Functioning, and Mental Health and Wellbeing      The first part of the session focused on reviewing her progress with becoming more physically active.  Discussed her positive emotions and so doing.  Reviewed the broaden your vision model of mental health which focuses on looking for and building opportunities to notice positive events even a small and experience positive emotions.          This session also focused on introducing Yanelis to a strengths-based, whole-person mental health and wellbeing-focused model of psychotherapy. This approach  incorporates empirically validated practice elements derived from cognitive behavioral therapy, acceptance and commitment therapy, positive psychology, mindfulness, and habit formation science. Specific psychotherapeutic modules within the model are personalized to help Yanelis work on and make progress with treatment goals. Culturally and trauma informed care principles are incorporated to contextualize therapeutic strategies to best fit the life experiences and views of Yanelis as indicated.     Based on above ratings and patient preference, Yanelis was guided to work on personalized psychotherapeutic modules within identified Health Practices that are italicized below to improve daily functioning and overall mental health and wellbeing. Instruction, training, guidance, along with traditional therapeutic processing, is used to promote growth in these areas for Yanelis.      Move Health Practice: Being Physically Active  Be Active Module: Being more active each day (habit formation and positive psychology)  Be Strong and Mobile Module: Building your strength, flexibility, and balance (habit formation and positive psychology)  Be Fit Module: Building your physical fitness (habit formation and positive psychology)   Notes: Reviewed and reinforced Yanelis for working on being more active and less sedentary; she had more success this time because she was apparently more motivated; she is scheduled to do arm exercises for minimum of 5 minutes on Mondays, Wednesdays, and Fridays at 10 AM; noted she wanted to make progress to build her confidence; noted positive emotions of hope, pride, and gratitude associated with this health habit improvement    Activate Health Practice: Being Responsible and Successful at Home, Work, and/or School  Conquer Avoidance Module: Avoiding less and actively doing more of what matters each day (behavioral activation and cognitive behavioral therapy)   Accomplish More Module: Doing  what needs to be done each day (executive functioning skills, behavioral activation, and habit formation)  Solve Problems Module: Overcome everyday problems to be more successful in what matters to you (executive functioning skills and cognitive behavioral therapy)  Notes: Reviewed and reinforced Yanelis for awareness and redirecting avoiding to behavioral activation strategies; she was able to do that to keep her self on track with her exercise routine as noted above     MENTAL STATUS EXAM     Yanelis was observed for the following indicators of mental status.     Appearance/Behavior: Adequate grooming, appropriate attire, good eye contact, calm, cooperative, no abnormal movements   Speech: Rate, volume and tone appropriate; no push or pressure; no rapid speech     Mood/Affect: Calm, affect congruent to situation   Thought Process/Content: Coherent, linear; no reports or evidence of auditory hallucinations; no reports or evidence of visual hallucinations    Thought Associations: Logical, no looseness of associations, no flight of ideas, no tangentially, no circumstantiality   Insight/Judgment: Adequate insight into condition and need for treatment; judgement not impaired   Orientation: Alert and oriented to person place, time, and circumstance   Recent and Past Memory: Good    Attention Span/Concentration: Good     Language: English with estimated vocabulary to be average or above   Fund of Knowledge: Estimated to be average or above fund of knowledge      SAFETY PLAN - NA, no SI or SIB     RECOMMENDATIONS     Based on a medical records review and the results of this encounter, it is recommended that Yanelis participate in the following behavioral health services.      Mental Health Outpatient Psychotherapy: Individual-focused skills and habit training with therapeutic processing to improve mental health and wellbeing, conducted by Dr. Vega   Psychiatric Evaluation and Services: Continue consulting with  Dr. Driscoll for psychiatric care including psychiatric diagnostic clarification, medication management, care coordination, and psychotherapy as needed    Primary and Specialty Care Services: Continue consulting with specialty care providers for cancer and PCP for healthcare, medication management, and care coordination as needed        DISPOSITION AND PLAN      Psychotherapy was conducted with Yanelis to make progress on treatment goals, enhance daily functioning, and improve mental health and wellbeing. Motivational interviewing was used to promote insight and engagement and to personalize psychotherapy interventions. If applicable, a safety plan was formulated and reviewed with Yanelis.      The engagement of Yanelis during the session is assessed by this provider as: High     The progress of Etienne on treatment plan goals and objectives is assessed by this provider as: Moderate       In line with feedback informed therapy, Yanelis was guided to complete ratings of perceived Helpfulness and Hopefulness regarding the current encounter, and the results and are recorded below. The results were collaboratively discussed to make adjustments or improvements in subsequent psychotherapy encounters.      Degree of helpfulness of this visit on a scale ranging from 0 is Not Helpful to 10 is Very Helpful - Historical to Current: Did not review  Degree of hopefulness in improving mental health and wellbeing on a scale ranging from 0 Not Hopeful to 10 Very Hopeful - Historical to Current: Did not review    The plan is for Yanelis to follow up with this provider for outpatient psychotherapy and will seek additional recommended services as indicated to continue work on treatment goals.     If there are any questions regarding this information please contact the [facility] at [phone].    David Vega, PhD, LP   Professor of Psychiatry and Behavioral Sciences  [facility]

## 2024-09-05 ENCOUNTER — VIRTUAL VISIT (OUTPATIENT)
Dept: PSYCHIATRY | Facility: CLINIC | Age: 49
End: 2024-09-05
Attending: PSYCHOLOGIST
Payer: COMMERCIAL

## 2024-09-05 DIAGNOSIS — F41.9 ANXIETY DISORDER, UNSPECIFIED TYPE: ICD-10-CM

## 2024-09-05 DIAGNOSIS — F33.0 MILD EPISODE OF RECURRENT MAJOR DEPRESSIVE DISORDER (H): ICD-10-CM

## 2024-09-05 DIAGNOSIS — F90.0 ATTENTION DEFICIT HYPERACTIVITY DISORDER (ADHD), PREDOMINANTLY INATTENTIVE TYPE: Primary | ICD-10-CM

## 2024-09-05 PROCEDURE — 90837 PSYTX W PT 60 MINUTES: CPT | Mod: 95 | Performed by: PSYCHOLOGIST

## 2024-09-05 NOTE — PROGRESS NOTES
[facility]  [address], [address]  [place] [address]  [phone]     OUTPATIENT PSYCHOTHERAPY ENCOUNTER      PATIENT     Name: Yanelis Lyons  YOB: 1975     SERVICES PROVIDED    Date of Service: 9/5/2024   Time of Service: 210 to 305 pm (55 Minutes)   Type of Service: 14483 Individual Psychotherapy (53+ min)   Service Provider: David Vega, PhD, LP     TELEMEDICINE ENCOUNTER METHODS     Telemedicine psychotherapy was conducted due to the preference Yanelis during scheduling. The patient's condition was safely assessed and treated via synchronous audio and visual telemedicine encounter. A secure real time interactive audio and visual telecommunication system (videoconference via [employer]) was used for the visit.     Patient Visit Location: Home in [place]      Present For Encounter: Yanelis     Provider Visit Location: HIPAA compliant location within provider home       ENCOUNTER SYNOPSIS     Yanelis participated in a psychotherapy session today with David Vega, PhD, LP. Background information, history, current condition, and available medical record notes were reviewed, and a brief clinical interview was conducted with Yanelis to update the treatment focus and planning as indicated. There was opportunity for Yanelis to discuss and process recent life happenings. The session included providing Yanelis with an evidence-informed, strengths-based, whole-person mental health and wellbeing-focused model of psychotherapy (see Treatment Plan and Psychotherapy Interventions). Coordination of care was also planned with other healthcare providers working with Yanelis if indicated.     BACKGROUND INFORMATION (From 7/8/2024 Intake; Updated 7/18/2024)      Yanelis lives with her father and brother.  She has a BA degree in physics and a BA in English. 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    Yanelis is currently unemployed and her more recent work history is \"spotty.\"  She has reportedly been fired from many jobs due to ADHD symptoms.  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    Yanelis was diagnosed with breast cancer in 2022.  She was successfully treated with chemotherapy, mastectomy, and radiation.    Yanelis is receiving mental health care.  She receives psychiatric care from Dr. Leora Driscoll.  Her diagnosis is reported to be ADHD and she is currently on Adderall.  In addition, she participates in ADHD support groups.    Currently, Yanelis is seeking ADHD informed psychotherapy with the current provider.  Her goal is to become \"reliable and productive again\" and learn how to deal with her executive functioning challenges.    Psychiatric Symptoms         The following symptoms and concerns are to be targeted in the treatment of Yanelis.      Inattention/Hyperactivity/Impulsivity: Reported she was diagnosed with ADHD at the age of 34; even though she is on Adderall, Yanelis reported current symptoms of inattention and to some degree also hyperactivity/impulsivity; in particular she gets overwhelmed with work/tasks and shuts down; indicated her executive functioning is poor and especially being disorganized, having a hard time staying focused, and getting frustrated a lot   Depression: Reported mild recurrent depressive symptoms including recently sadness, irritability, decreased energy, low motivation, poor concentration, sleep disturbance, negative thoughts    Hypomania/Josephine: None reported    Anxiety: Reported recurrent symptoms of worrying, ruminating, nervousness, heightened physical tension, avoidance     Panic: None reported   Obsessions/Compulsions: None reported   Post-Traumatic Stress: None reported   Sleep " "Problems and Fatigue: Reported her sleep is a \"mess\" since being treated for cancer with side effects coming from the many medications she has been on; noted she uses trazodone intermittently for sleep as needed; had 2 prior sleep studies and was determined to be \"normal\" without apnea or restlessness  Alcohol Abuse: None reported   Drug Abuse: None reported     Psychotic: None reported     Suicidal Thoughts: None reported recently; had suicidal ideation as a teenager    Cutting/Self-Injury: None reported         Functional Concerns      The following functional concerns are to be targeted in the treatment of Yanelis.      Home: Reported struggling with household tasks and upkeep      School: Reported having difficulty being organized and completing homework and academic tasks in high school and college      Work: Reported is distractible, off task, forgets things, etc. on the job and she has been fired from several jobs as a result    Financial: Reported prior history of significant credit card debt due to overspending        History     The following reflect pertinent historical findings identified for Yanelis.      Developmental Delays: None reported    Family Problems: Reported that her mother was impatient with her growing up and often yelled at her; indicated that her mother passed away in 2021 from a lung disease; observed that her mother was \"the center of the family\" and the family is struggling without her    Child Abuse/Neglect/Trauma: None reported    Educational Problems: None reported    Mental Health Problems: Reported had undiagnosed depression during her teenage years and that she was officially diagnosed while she attended college; noted she has been struggling with recurrent depressive symptoms throughout adulthood    Physical Health Problems: Reported prior history of breast cancer that was successfully treated and is currently in remission; noted recently being diagnosed with postural " orthostatic tachycardia syndrome (POTS)  Family History of Mental Health Problems: Reported suspicion that her father has ADHD but this has not been officially diagnosed; believes her brother is depressed     Other Problems: None reported      Strengths and Protective Factors      The following reflect strengths and protective factors that could be leveraged in treatment of Yanelis.      Personal: Reported she is persistent and that she is good with knitting, sewing, cross stitching, and painting     Support System: Reported to be her father and brother      CHECK-IN FOR THIS ENCOUNTER     Questionnaire Data (Submitted on 9/5/2024)    PHQ9 Total Score: 15 (moderate depression)     Recent Happenings     Checked in with Yanelis to review how it has been going recently     Noteworthy Events: Reported routine life events  Noteworthy Stressors/Problems: None reported          Recent Health Concerns     Checked in with Yanelis to review current health concerns and rated them ranging from 0 = Currently Low Concern to 10 = Currently Very High Concern.      Emotional Health Concerns: Current level of concern about overall distress, anxiety, depression, or stress - Historical to Current: Moderate; moderate; moderate; moderate to high; moderate  Notes: Reported depressive symptoms were more pronounced lately        Physical Health Concerns: Current level of concern about overall physical health, physical activity, body weight, health habits, or pain - Historical to Current: Moderate; moderate; moderate; moderate; moderate  Notes: Reported again feeling generally healthy but again noted fatigue and low energy    Living Health Concerns: Current level of concern about functioning at home, at school and/or work, and in important relationships - Historical to Current: NA  Notes: Did not review    Recent Safety Concerns    Self: None reported   Others: None reported       TREATMENT PLAN AND PSYCHOTHERAPY UPDATE FOR THIS  ENCOUNTER     Diagnoses Being Treated      Yanelis qualifies for diagnoses of ADHD-I; major depression, recurrent, mild; anxiety disorder, unspecified     Treatment Symptoms Goals Addressed and Progress      Based on the Background Information above, the following personalized psychiatric symptom domains for Yanelis are targeted for improvement through psychotherapy. The goal is to achieve an 8 or higher over the course of treatment. Progress to date ranges from 0 Limited to 5 Some to 10 Significant. This progress is episodically reviewed with Yanelis to make treatment decisions.     Improve Understanding of Condition/Illness: Baseline estimated to be 6; 7  Reduce Inattention: Baseline estimated to be 5; 6  Reduce Depression: Baseline estimated to be 5; 6  Reduce Anxiety: Baseline estimated to be 6; 6  Reduce Sleep Problems and Fatigue: Baseline estimated to be 5; 6    Functional Goals Addressed and Progress      Based on the Background Information above, the following personalized functional domains for Yanelis are targeted for improvement through psychotherapy. The goal is to achieve an 8 or higher over the course of treatment. Progress to date ranges from 0 Limited to 5 Some to 10 Significant. This progress is episodically reviewed with Yanelis to make treatment decisions.     Home: Baseline estimated to be 5; 6  School: NA currently  Work: Baseline estimated to be 4; NA currently  Financial: NA currently    Mental Health and Wellbeing Health Practices Addressed and Progress     Using a brief psychotherapy collaborative decision aid, Yanelis was guided to make self-ratings for perceived level of mastery in 12 Health Practices that are aligned with Four Worlds of Mental Health and Wellbeing. Ratings range from 0 = Not Good at This to 10 = Great at This. Ratings below were recorded on 7/8/2024.      Internal World: Regulation of Emotions, Thoughts, and Stress   Soothe Health Practice: I am calm and  manage my emotions - Historical to Current: 5.5  Think Health Practice: I have positive, hopeful and helpful thoughts - Historical to Current: 5 (indicated being self-critical)  Rest Health Practice: I am well rested and energized - Historical to Current: 4    Physical World: Physical Health      Hydrate Health Practice: I drink enough water every day and stay hydrated - Historical to Current: 6  Nourish Health Practice: I eat healthy - Historical to Current: 4 (indicated does not eat breakfast)  Move Health Practice: I am physically active - Historical to Current: 2    External World: Social Relationships and Productive Behaviors   Express Health Practice: I listen to others and express myself well  - Historical to Current: 8  Connect Health Practice: I have positive, healthy and meaningful relationships - Historical to Current: 7  Activate Health Practice: I am responsible and successful at home, work, school - Historical to Current: 3      Spiritual World: Contemplation, Purpose and Meaning, and Living According to Beliefs   Observe Health Practice: I am thankful, accepting, and live in the moment - Historical to Current: 7.5  Seek Health Practice: I do things that give me meaning and purpose - Historical to Current: 7.5  Believe Health Practice: My values and beliefs guide how I am living - Historical to Current: 7.5    Psychotherapy Interventions Provided to Improve Symptoms, Functioning, and Mental Health and Wellbeing      The first part of the session focused on reviewing and processing her relationship with her mother.  Noted receiving mixed messages over the years, she was hard to please, and thought her mother reviewed her somewhat as a failure.  Christel noted viewing herself as a burden for her mother which caused her angst.  Also noted feeling like the family scapegoat sometimes between her 2 parents and brother.  Indicated some of this is unresolved since her mother passed away and they cannot talk about  it.  Supported and validated Christel through this difficult discussion.  She noted feeling better after discussing it    Again reviewed her progress with becoming more physically active and the positive emotions for so doing.  Christel having kept up with her previous goal of doing stretching exercises 3 times a week.  She would like to build upon this to add in additional weight lifting exercises.            This session also focused on introducing Yanelis to a strengths-based, whole-person mental health and wellbeing-focused model of psychotherapy. This approach incorporates empirically validated practice elements derived from cognitive behavioral therapy, acceptance and commitment therapy, positive psychology, mindfulness, and habit formation science. Specific psychotherapeutic modules within the model are personalized to help Yanelis work on and make progress with treatment goals. Culturally and trauma informed care principles are incorporated to contextualize therapeutic strategies to best fit the life experiences and views of Yanelis as indicated.     Based on above ratings and patient preference, Yanelis was guided to work on personalized psychotherapeutic modules within identified Health Practices that are italicized below to improve daily functioning and overall mental health and wellbeing. Instruction, training, guidance, along with traditional therapeutic processing, is used to promote growth in these areas for Yanelis.      Move Health Practice: Being Physically Active  Be Active Module: Being more active each day (habit formation and positive psychology)  Be Strong and Mobile Module: Building your strength, flexibility, and balance (habit formation and positive psychology)  Be Fit Module: Building your physical fitness (habit formation and positive psychology)   Notes: Reviewed and reinforced Yanelis for working on being more active and less sedentary    Activate Health Practice: Being  Responsible and Successful at Home, Work, and/or School  Conquer Avoidance Module: Avoiding less and actively doing more of what matters each day (behavioral activation and cognitive behavioral therapy)   Accomplish More Module: Doing what needs to be done each day (executive functioning skills, behavioral activation, and habit formation)  Solve Problems Module: Overcome everyday problems to be more successful in what matters to you (executive functioning skills and cognitive behavioral therapy)  Notes: Reviewed and reinforced Yanelis for awareness and redirecting avoiding to behavioral activation strategies; talked about how she has a tendency to overthink things which paralyzes her and prevents her from being activated; discussed again activating calmly by using relaxation techniques      MENTAL STATUS EXAM     Yanelis was observed for the following indicators of mental status.     Appearance/Behavior: Adequate grooming, appropriate attire, good eye contact, calm, cooperative, no abnormal movements   Speech: Rate, volume and tone appropriate; no push or pressure; no rapid speech     Mood/Affect: Calm, affect congruent to situation   Thought Process/Content: Coherent, linear; no reports or evidence of auditory hallucinations; no reports or evidence of visual hallucinations    Thought Associations: Logical, no looseness of associations, no flight of ideas, no tangentially, no circumstantiality   Insight/Judgment: Adequate insight into condition and need for treatment; judgement not impaired   Orientation: Alert and oriented to person place, time, and circumstance   Recent and Past Memory: Good    Attention Span/Concentration: Good     Language: English with estimated vocabulary to be average or above   Fund of Knowledge: Estimated to be average or above fund of knowledge      SAFETY PLAN - NA, no SI or SIB     RECOMMENDATIONS     Based on a medical records review and the results of this encounter, it is  recommended that Yanelis participate in the following behavioral health services.      Mental Health Outpatient Psychotherapy: Individual-focused skills and habit training with therapeutic processing to improve mental health and wellbeing, conducted by Dr. Vega   Psychiatric Evaluation and Services: Continue consulting with Dr. Driscoll for psychiatric care including psychiatric diagnostic clarification, medication management, care coordination, and psychotherapy as needed    Primary and Specialty Care Services: Continue consulting with specialty care providers for cancer and PCP for healthcare, medication management, and care coordination as needed        DISPOSITION AND PLAN      Psychotherapy was conducted with Yanelis to make progress on treatment goals, enhance daily functioning, and improve mental health and wellbeing. Motivational interviewing was used to promote insight and engagement and to personalize psychotherapy interventions. If applicable, a safety plan was formulated and reviewed with Yanelis.      The engagement of Yanelis during the session is assessed by this provider as: High     The progress of Etienne on treatment plan goals and objectives is assessed by this provider as: Moderate       In line with feedback informed therapy, Yanelis was guided to complete ratings of perceived Helpfulness and Hopefulness regarding the current encounter, and the results and are recorded below. The results were collaboratively discussed to make adjustments or improvements in subsequent psychotherapy encounters.      Degree of helpfulness of this visit on a scale ranging from 0 is Not Helpful to 10 is Very Helpful - Historical to Current: Did not review  Degree of hopefulness in improving mental health and wellbeing on a scale ranging from 0 Not Hopeful to 10 Very Hopeful - Historical to Current: Did not review    The plan is for Yanelis to follow up with this provider for outpatient  psychotherapy and will seek additional recommended services as indicated to continue work on treatment goals.     If there are any questions regarding this information please contact the [facility] at [phone].    David Vega, PhD, LP   Professor of Psychiatry and Behavioral Sciences  [facility]

## 2024-09-25 ENCOUNTER — VIRTUAL VISIT (OUTPATIENT)
Dept: PSYCHIATRY | Facility: CLINIC | Age: 49
End: 2024-09-25
Attending: PSYCHOLOGIST
Payer: COMMERCIAL

## 2024-09-25 DIAGNOSIS — F33.0 MILD EPISODE OF RECURRENT MAJOR DEPRESSIVE DISORDER (H): ICD-10-CM

## 2024-09-25 DIAGNOSIS — F90.0 ATTENTION DEFICIT HYPERACTIVITY DISORDER (ADHD), PREDOMINANTLY INATTENTIVE TYPE: Primary | ICD-10-CM

## 2024-09-25 DIAGNOSIS — F41.9 ANXIETY DISORDER, UNSPECIFIED TYPE: ICD-10-CM

## 2024-09-25 PROCEDURE — 90837 PSYTX W PT 60 MINUTES: CPT | Mod: 95 | Performed by: PSYCHOLOGIST

## 2024-10-02 NOTE — PROGRESS NOTES
[facility]  [address], [address]  [place] [address]  [phone]     OUTPATIENT PSYCHOTHERAPY ENCOUNTER      PATIENT     Name: Yanelis Lyons  YOB: 1975     SERVICES PROVIDED    Date of Service: 9/25/2024   Time of Service: 209 to 302 pm (53 Minutes)   Type of Service: 50700 Individual Psychotherapy (53+ min)   Service Provider: David Vega, PhD, LP     TELEMEDICINE ENCOUNTER METHODS     Telemedicine psychotherapy was conducted due to the preference Yanelis during scheduling. The patient's condition was safely assessed and treated via synchronous audio and visual telemedicine encounter. A secure real time interactive audio and visual telecommunication system (videoconference via [employer]) was used for the visit.     Patient Visit Location: Home in [place]      Present For Encounter: Yanelis     Provider Visit Location: HIPAA compliant location within provider home       ENCOUNTER SYNOPSIS     Yanelis participated in a psychotherapy session today with David Vega, PhD, LP. Background information, history, current condition, and available medical record notes were reviewed, and a brief clinical interview was conducted with Yanelis to update the treatment focus and planning as indicated. There was opportunity for Yanelis to discuss and process recent life happenings. The session included providing Yanelis with an evidence-informed, strengths-based, whole-person mental health and wellbeing-focused model of psychotherapy (see Treatment Plan and Psychotherapy Interventions). Coordination of care was also planned with other healthcare providers working with Yanelis if indicated.     BACKGROUND INFORMATION (From 7/8/2024 Intake; Updated 7/18/2024)      Yanelis lives with her father and brother.  She has a BA degree in physics and a BA in English. 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    Yanelis is currently unemployed and her more recent work history is \"spotty.\"  She has reportedly been fired from many jobs due to ADHD symptoms.  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    Yanelis was diagnosed with breast cancer in 2022.  She was successfully treated with chemotherapy, mastectomy, and radiation.    Yanelis is receiving mental health care.  She receives psychiatric care from Dr. Leora Driscoll.  Her diagnosis is reported to be ADHD and she is currently on Adderall.  In addition, she participates in ADHD support groups.    Currently, Yanelis is seeking ADHD informed psychotherapy with the current provider.  Her goal is to become \"reliable and productive again\" and learn how to deal with her executive functioning challenges.    Psychiatric Symptoms         The following symptoms and concerns are to be targeted in the treatment of Yanelis.      Inattention/Hyperactivity/Impulsivity: Reported she was diagnosed with ADHD at the age of 34; even though she is on Adderall, Yanelis reported current symptoms of inattention and to some degree also hyperactivity/impulsivity; in particular she gets overwhelmed with work/tasks and shuts down; indicated her executive functioning is poor and especially being disorganized, having a hard time staying focused, and getting frustrated a lot   Depression: Reported mild recurrent depressive symptoms including recently sadness, irritability, decreased energy, low motivation, poor concentration, sleep disturbance, negative thoughts    Hypomania/Josephine: None reported    Anxiety: Reported recurrent symptoms of worrying, ruminating, nervousness, heightened physical tension, avoidance     Panic: None reported   Obsessions/Compulsions: None reported   Post-Traumatic Stress: None reported   Sleep " "Problems and Fatigue: Reported her sleep is a \"mess\" since being treated for cancer with side effects coming from the many medications she has been on; noted she uses trazodone intermittently for sleep as needed; had 2 prior sleep studies and was determined to be \"normal\" without apnea or restlessness  Alcohol Abuse: None reported   Drug Abuse: None reported     Psychotic: None reported     Suicidal Thoughts: None reported recently; had suicidal ideation as a teenager    Cutting/Self-Injury: None reported         Functional Concerns      The following functional concerns are to be targeted in the treatment of Yanelis.      Home: Reported struggling with household tasks and upkeep      School: Reported having difficulty being organized and completing homework and academic tasks in high school and college      Work: Reported is distractible, off task, forgets things, etc. on the job and she has been fired from several jobs as a result    Financial: Reported prior history of significant credit card debt due to overspending        History     The following reflect pertinent historical findings identified for Yanelis.      Developmental Delays: None reported    Family Problems: Reported that her mother was impatient with her growing up and often yelled at her; indicated that her mother passed away in 2021 from a lung disease; observed that her mother was \"the center of the family\" and the family is struggling without her    Child Abuse/Neglect/Trauma: None reported    Educational Problems: None reported    Mental Health Problems: Reported had undiagnosed depression during her teenage years and that she was officially diagnosed while she attended college; noted she has been struggling with recurrent depressive symptoms throughout adulthood    Physical Health Problems: Reported prior history of breast cancer that was successfully treated and is currently in remission; noted recently being diagnosed with postural " orthostatic tachycardia syndrome (POTS)  Family History of Mental Health Problems: Reported suspicion that her father has ADHD but this has not been officially diagnosed; believes her brother is depressed     Other Problems: None reported      Strengths and Protective Factors      The following reflect strengths and protective factors that could be leveraged in treatment of Yanelis.      Personal: Reported she is persistent and that she is good with knitting, sewing, cross stitching, and painting     Support System: Reported to be her father and brother      CHECK-IN FOR THIS ENCOUNTER     Recent Happenings     Checked in with Yanelis to review how it has been going recently     Noteworthy Events: Reported routine life events; indicated is trying to connect with her grandmother more, now that she is getting older  Noteworthy Stressors/Problems: None reported          Recent Health Concerns     Checked in with Yanelis to review current health concerns and rated them ranging from 0 = Currently Low Concern to 10 = Currently Very High Concern.      Emotional Health Concerns: Current level of concern about overall distress, anxiety, depression, or stress - Historical to Current: Moderate; moderate; moderate; moderate to high; moderate; moderate  Notes: Reported her mood has been going up and down recently        Physical Health Concerns: Current level of concern about overall physical health, physical activity, body weight, health habits, or pain - Historical to Current: Moderate; moderate; moderate; moderate; moderate; low to moderate  Notes: Reported again feeling generally healthy with a little less fatigue and a little more energy lately     Living Health Concerns: Current level of concern about functioning at home, at school and/or work, and in important relationships - Historical to Current: NA  Notes: Did not review    Recent Safety Concerns    Self: None reported   Others: None reported       TREATMENT PLAN  AND PSYCHOTHERAPY UPDATE FOR THIS ENCOUNTER     Diagnoses Being Treated      Yanelis qualifies for diagnoses of ADHD-I; major depression, recurrent, mild; anxiety disorder, unspecified     Treatment Symptoms Goals Addressed and Progress      Based on the Background Information above, the following personalized psychiatric symptom domains for Yanelis are targeted for improvement through psychotherapy. The goal is to achieve an 8 or higher over the course of treatment. Progress to date ranges from 0 Limited to 5 Some to 10 Significant. This progress is episodically reviewed with Yanelis to make treatment decisions.     Improve Understanding of Condition/Illness: Baseline estimated to be 6; 7  Reduce Inattention: Baseline estimated to be 5; 6  Reduce Depression: Baseline estimated to be 5; 6  Reduce Anxiety: Baseline estimated to be 6; 6  Reduce Sleep Problems and Fatigue: Baseline estimated to be 5; 6    Functional Goals Addressed and Progress      Based on the Background Information above, the following personalized functional domains for Yanelis are targeted for improvement through psychotherapy. The goal is to achieve an 8 or higher over the course of treatment. Progress to date ranges from 0 Limited to 5 Some to 10 Significant. This progress is episodically reviewed with Yanelis to make treatment decisions.     Home: Baseline estimated to be 5; 6  School: NA currently  Work: Baseline estimated to be 4; NA currently  Financial: NA currently    Mental Health and Wellbeing Health Practices Addressed and Progress     Using a brief psychotherapy collaborative decision aid, Yanelis was guided to make self-ratings for perceived level of mastery in 12 Health Practices that are aligned with Four Worlds of Mental Health and Wellbeing. Ratings range from 0 = Not Good at This to 10 = Great at This. Ratings below were recorded on 7/8/2024.      Internal World: Regulation of Emotions, Thoughts, and Stress   Soothe  "Health Practice: I am calm and manage my emotions - Historical to Current: 5.5  Think Health Practice: I have positive, hopeful and helpful thoughts - Historical to Current: 5 (indicated being self-critical)  Rest Health Practice: I am well rested and energized - Historical to Current: 4    Physical World: Physical Health      Hydrate Health Practice: I drink enough water every day and stay hydrated - Historical to Current: 6  Nourish Health Practice: I eat healthy - Historical to Current: 4 (indicated does not eat breakfast)  Move Health Practice: I am physically active - Historical to Current: 2    External World: Social Relationships and Productive Behaviors   Express Health Practice: I listen to others and express myself well  - Historical to Current: 8  Connect Health Practice: I have positive, healthy and meaningful relationships - Historical to Current: 7  Activate Health Practice: I am responsible and successful at home, work, school - Historical to Current: 3      Spiritual World: Contemplation, Purpose and Meaning, and Living According to Beliefs   Observe Health Practice: I am thankful, accepting, and live in the moment - Historical to Current: 7.5  Seek Health Practice: I do things that give me meaning and purpose - Historical to Current: 7.5  Believe Health Practice: My values and beliefs guide how I am living - Historical to Current: 7.5    Psychotherapy Interventions Provided to Improve Symptoms, Functioning, and Mental Health and Wellbeing      Again reviewed her progress with becoming more physically active and the positive emotions for so doing.  Christel noted she is \"chipping away\" at it and making slow progress toward her goal of doing stretching exercises 3 times a week. Discussed and processed how are nothing thinking and perfectionism can interfere with her making small incremental progress towards her goal; reviewed habit psychology related to small changes and short intervals of her exercise " routine. This resonated with Christel and she recommitted to working on her stretching exercises.            This session also focused on a strengths-based, whole-person mental health and wellbeing-focused model of psychotherapy for Christel. This approach incorporates empirically validated practice elements derived from cognitive behavioral therapy, acceptance and commitment therapy, positive psychology, mindfulness, and habit formation science. Specific psychotherapeutic modules within the model are personalized to help Yanelis work on and make progress with treatment goals. Culturally and trauma informed care principles are incorporated to contextualize therapeutic strategies to best fit the life experiences and views of Yanelis as indicated.     Based on above ratings and patient preference, Yanelis was guided to work on personalized psychotherapeutic modules within identified Health Practices that are italicized below to improve daily functioning and overall mental health and wellbeing. Instruction, training, guidance, along with traditional therapeutic processing, is used to promote growth in these areas for Yanelis.      Move Health Practice: Being Physically Active  Be Active Module: Being more active each day (habit formation and positive psychology)  Be Strong and Mobile Module: Building your strength, flexibility, and balance (habit formation and positive psychology)  Be Fit Module: Building your physical fitness (habit formation and positive psychology)   Notes: Reviewed and reinforced Yanelis again or working on being more active and less sedentary    Activate Health Practice: Being Responsible and Successful at Home, Work, and/or School  Conquer Avoidance Module: Avoiding less and actively doing more of what matters each day (behavioral activation and cognitive behavioral therapy)   Accomplish More Module: Doing what needs to be done each day (executive functioning skills, behavioral activation,  and habit formation)  Solve Problems Module: Overcome everyday problems to be more successful in what matters to you (executive functioning skills and cognitive behavioral therapy)  Notes: Reviewed and reinforced Yanelis again being aware and redirecting avoiding to behavioral activation strategies; She also noted her intention to be activated in cleaning and organizing her room in small increments and trying to be more mindful to eat lunch between the hour of 1 to 2 PM; discussed scheduling all of her activation plans and reviewed a specific scheduling technique       MENTAL STATUS EXAM     Yanelis was observed for the following indicators of mental status.     Appearance/Behavior: Adequate grooming, appropriate attire, good eye contact, calm, cooperative, no abnormal movements   Speech: Rate, volume and tone appropriate; no push or pressure; no rapid speech     Mood/Affect: Calm, affect congruent to situation   Thought Process/Content: Coherent, linear; no reports or evidence of auditory hallucinations; no reports or evidence of visual hallucinations    Thought Associations: Logical, no looseness of associations, no flight of ideas, no tangentially, no circumstantiality   Insight/Judgment: Adequate insight into condition and need for treatment; judgement not impaired   Orientation: Alert and oriented to person place, time, and circumstance   Recent and Past Memory: Good    Attention Span/Concentration: Good     Language: English with estimated vocabulary to be average or above   Fund of Knowledge: Estimated to be average or above fund of knowledge      SAFETY PLAN - NA, no SI or SIB     RECOMMENDATIONS     Based on a medical records review and the results of this encounter, it is recommended that Yanelis participate in the following behavioral health services.      Mental Health Outpatient Psychotherapy: Individual-focused skills and habit training with therapeutic processing to improve mental health and  wellbeing, conducted by Dr. Vega   Psychiatric Evaluation and Services: Continue consulting with Dr. Driscoll for psychiatric care including psychiatric diagnostic clarification, medication management, care coordination, and psychotherapy as needed    Primary and Specialty Care Services: Continue consulting with specialty care providers for cancer and PCP for healthcare, medication management, and care coordination as needed        DISPOSITION AND PLAN      Psychotherapy was conducted with Yanelis to make progress on treatment goals, enhance daily functioning, and improve mental health and wellbeing. Motivational interviewing was used to promote insight and engagement and to personalize psychotherapy interventions. If applicable, a safety plan was formulated and reviewed with Yanelis.      The engagement of Yanelis during the session is assessed by this provider as: High     The progress of Etienne on treatment plan goals and objectives is assessed by this provider as: Moderate        In line with feedback informed therapy, Yanelis was guided to complete ratings of perceived Helpfulness and Hopefulness regarding the current encounter, and the results and are recorded below. The results were collaboratively discussed to make adjustments or improvements in subsequent psychotherapy encounters.      Degree of helpfulness of this visit on a scale ranging from 0 is Not Helpful to 10 is Very Helpful - Historical to Current: Did not review  Degree of hopefulness in improving mental health and wellbeing on a scale ranging from 0 Not Hopeful to 10 Very Hopeful - Historical to Current: Did not review    The plan is for Yanelis to follow up with this provider for outpatient psychotherapy and will seek additional recommended services as indicated to continue work on treatment goals.     If there are any questions regarding this information please contact the [facility] at  [phone].    David Vega, PhD, LP   Professor of Psychiatry and Behavioral Sciences  [facility]

## 2024-10-03 ENCOUNTER — VIRTUAL VISIT (OUTPATIENT)
Dept: PSYCHIATRY | Facility: CLINIC | Age: 49
End: 2024-10-03
Attending: PSYCHOLOGIST
Payer: COMMERCIAL

## 2024-10-03 DIAGNOSIS — F90.0 ATTENTION DEFICIT HYPERACTIVITY DISORDER (ADHD), PREDOMINANTLY INATTENTIVE TYPE: Primary | ICD-10-CM

## 2024-10-03 DIAGNOSIS — F33.0 MILD EPISODE OF RECURRENT MAJOR DEPRESSIVE DISORDER (H): ICD-10-CM

## 2024-10-03 DIAGNOSIS — F41.9 ANXIETY DISORDER, UNSPECIFIED TYPE: ICD-10-CM

## 2024-10-03 PROCEDURE — 90837 PSYTX W PT 60 MINUTES: CPT | Mod: 95 | Performed by: PSYCHOLOGIST

## 2024-10-08 NOTE — PROGRESS NOTES
[facility]  [address]  [place] [address]  [phone]     OUTPATIENT PSYCHOTHERAPY ENCOUNTER      PATIENT     Name: Yanelis Lyons  YOB: 1975     SERVICES PROVIDED    Date of Service: 10/3/2024   Time of Service: 1003 to 1101 am (58 Minutes)   Type of Service: 55669 Individual Psychotherapy (53+ min)   Service Provider: David Vega, , LP     TELEMEDICINE ENCOUNTER METHODS     Telemedicine psychotherapy was conducted due to the preference Yanelis during scheduling. The patient's condition was safely assessed and treated via synchronous audio and visual telemedicine encounter. A secure real time interactive audio and visual telecommunication system (videoconference via [employer]) was used for the visit.     Patient Visit Location: Home in [place]      Present For Encounter: Yanelis     Provider Visit Location: HIPAA compliant location within provider home       ENCOUNTER SYNOPSIS     Yanelis participated in a psychotherapy session today with David Vega, PhD, LP. Background information, history, current condition, and available medical record notes were reviewed, and a brief clinical interview was conducted with Yanelis to update the treatment focus and planning as indicated. There was opportunity for Yanelis to discuss and process recent life happenings. The session included providing Ynaelis with an evidence-informed, strengths-based, whole-person mental health and wellbeing-focused model of psychotherapy (see Treatment Plan and Psychotherapy Interventions). Coordination of care was also planned with other healthcare providers working with Yanelis if indicated.     BACKGROUND INFORMATION (From 7/8/2024 Intake; Updated 7/18/2024)      Yanelis lives with her father and brother.  She has a BA degree in physics and a BA in English. 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    Yanelis is currently unemployed and her more recent work history is \"spotty.\"  She has reportedly been fired from many jobs due to ADHD symptoms.  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    Yanelis was diagnosed with breast cancer in 2022.  She was successfully treated with chemotherapy, mastectomy, and radiation.    Yanelis is receiving mental health care.  She receives psychiatric care from Dr. Leora Driscoll.  Her diagnosis is reported to be ADHD and she is currently on Adderall.  In addition, she participates in ADHD support groups.    Currently, Yanelis is seeking ADHD informed psychotherapy with the current provider.  Her goal is to become \"reliable and productive again\" and learn how to deal with her executive functioning challenges.    Psychiatric Symptoms         The following symptoms and concerns are to be targeted in the treatment of Yanelis.      Inattention/Hyperactivity/Impulsivity: Reported she was diagnosed with ADHD at the age of 34; even though she is on Adderall, Yanelis reported current symptoms of inattention and to some degree also hyperactivity/impulsivity; in particular she gets overwhelmed with work/tasks and shuts down; indicated her executive functioning is poor and especially being disorganized, having a hard time staying focused, and getting frustrated a lot   Depression: Reported mild recurrent depressive symptoms including recently sadness, irritability, decreased energy, low motivation, poor concentration, sleep disturbance, negative thoughts    Hypomania/Josephine: None reported    Anxiety: Reported recurrent symptoms of worrying, ruminating, nervousness, heightened physical tension, avoidance     Panic: None reported   Obsessions/Compulsions: None reported   Post-Traumatic Stress: None reported   Sleep " "Problems and Fatigue: Reported her sleep is a \"mess\" since being treated for cancer with side effects coming from the many medications she has been on; noted she uses trazodone intermittently for sleep as needed; had 2 prior sleep studies and was determined to be \"normal\" without apnea or restlessness  Alcohol Abuse: None reported   Drug Abuse: None reported     Psychotic: None reported     Suicidal Thoughts: None reported recently; had suicidal ideation as a teenager    Cutting/Self-Injury: None reported         Functional Concerns      The following functional concerns are to be targeted in the treatment of Yanelis.      Home: Reported struggling with household tasks and upkeep      School: Reported having difficulty being organized and completing homework and academic tasks in high school and college      Work: Reported is distractible, off task, forgets things, etc. on the job and she has been fired from several jobs as a result    Financial: Reported prior history of significant credit card debt due to overspending        History     The following reflect pertinent historical findings identified for Yanelis.      Developmental Delays: None reported    Family Problems: Reported that her mother was impatient with her growing up and often yelled at her; indicated that her mother passed away in 2021 from a lung disease; observed that her mother was \"the center of the family\" and the family is struggling without her    Child Abuse/Neglect/Trauma: None reported    Educational Problems: None reported    Mental Health Problems: Reported had undiagnosed depression during her teenage years and that she was officially diagnosed while she attended college; noted she has been struggling with recurrent depressive symptoms throughout adulthood    Physical Health Problems: Reported prior history of breast cancer that was successfully treated and is currently in remission; noted recently being diagnosed with postural " orthostatic tachycardia syndrome (POTS)  Family History of Mental Health Problems: Reported suspicion that her father has ADHD but this has not been officially diagnosed; believes her brother is depressed     Other Problems: None reported      Strengths and Protective Factors      The following reflect strengths and protective factors that could be leveraged in treatment of Yanelis.      Personal: Reported she is persistent and that she is good with knitting, sewing, cross stitching, and painting     Support System: Reported to be her father and brother      CHECK-IN FOR THIS ENCOUNTER     Recent Happenings     Checked in with Yanelis to review how it has been going recently     Noteworthy Events: Reported routine life events   Noteworthy Stressors/Problems: Reported dealing with significant plumbing problems in her house causing stress           Recent Health Concerns     Checked in with Yanelis to review current health concerns and rated them ranging from 0 = Currently Low Concern to 10 = Currently Very High Concern.      Emotional Health Concerns: Current level of concern about overall distress, anxiety, depression, or stress - Historical to Current: Moderate; moderate; moderate; moderate to high; moderate; moderate; moderate to high  Notes: Reported being overwhelmed with personal health worries, concerned about her grandmother's health, and presidential election stress         Physical Health Concerns: Current level of concern about overall physical health, physical activity, body weight, health habits, or pain - Historical to Current: Moderate; moderate; moderate; moderate; moderate; low to moderate; moderate  Notes: Reported again feeling generally healthy; noted had a recent physical and will be getting blood work results soon; noted has to have a spinal tap in the future related to neurological assessment      Living Health Concerns: Current level of concern about functioning at home, at school and/or  work, and in important relationships - Historical to Current: NA  Notes: Did not review    Recent Safety Concerns    Self: None reported   Others: None reported       TREATMENT PLAN AND PSYCHOTHERAPY UPDATE FOR THIS ENCOUNTER     Diagnoses Being Treated      Yanelis qualifies for diagnoses of ADHD-I; major depression, recurrent, mild; anxiety disorder, unspecified     Treatment Symptoms Goals Addressed and Progress      Based on the Background Information above, the following personalized psychiatric symptom domains for Yanelis are targeted for improvement through psychotherapy. The goal is to achieve an 8 or higher over the course of treatment. Progress to date ranges from 0 Limited to 5 Some to 10 Significant. This progress is episodically reviewed with Yanelis to make treatment decisions.     Improve Understanding of Condition/Illness: Baseline estimated to be 6; 7  Reduce Inattention: Baseline estimated to be 5; 6  Reduce Depression: Baseline estimated to be 5; 6  Reduce Anxiety: Baseline estimated to be 6; 6  Reduce Sleep Problems and Fatigue: Baseline estimated to be 5; 6    Functional Goals Addressed and Progress      Based on the Background Information above, the following personalized functional domains for Yanelis are targeted for improvement through psychotherapy. The goal is to achieve an 8 or higher over the course of treatment. Progress to date ranges from 0 Limited to 5 Some to 10 Significant. This progress is episodically reviewed with Yanelis to make treatment decisions.     Home: Baseline estimated to be 5; 6  School: NA currently  Work: Baseline estimated to be 4; NA currently  Financial: NA currently    Mental Health and Wellbeing Health Practices Addressed and Progress     Using a brief psychotherapy collaborative decision aid, Yanelis was guided to make self-ratings for perceived level of mastery in 12 Health Practices that are aligned with Four Worlds of Mental Health and  Wellbeing. Ratings range from 0 = Not Good at This to 10 = Great at This. Ratings below were recorded on 7/8/2024.      Internal World: Regulation of Emotions, Thoughts, and Stress   Soothe Health Practice: I am calm and manage my emotions - Historical to Current: 5.5  Think Health Practice: I have positive, hopeful and helpful thoughts - Historical to Current: 5 (indicated being self-critical)  Rest Health Practice: I am well rested and energized - Historical to Current: 4    Physical World: Physical Health      Hydrate Health Practice: I drink enough water every day and stay hydrated - Historical to Current: 6  Nourish Health Practice: I eat healthy - Historical to Current: 4 (indicated does not eat breakfast)  Move Health Practice: I am physically active - Historical to Current: 2    External World: Social Relationships and Productive Behaviors   Express Health Practice: I listen to others and express myself well  - Historical to Current: 8  Connect Health Practice: I have positive, healthy and meaningful relationships - Historical to Current: 7  Activate Health Practice: I am responsible and successful at home, work, school - Historical to Current: 3      Spiritual World: Contemplation, Purpose and Meaning, and Living According to Beliefs   Observe Health Practice: I am thankful, accepting, and live in the moment - Historical to Current: 7.5  Seek Health Practice: I do things that give me meaning and purpose - Historical to Current: 7.5  Believe Health Practice: My values and beliefs guide how I am living - Historical to Current: 7.5    Psychotherapy Interventions Provided to Improve Symptoms, Functioning, and Mental Health and Wellbeing      This session focused on a strengths-based, whole-person mental health and wellbeing-focused model of psychotherapy for Christel. This approach incorporates empirically validated practice elements derived from cognitive behavioral therapy, acceptance and commitment therapy,  positive psychology, mindfulness, and habit formation science. Specific psychotherapeutic modules within the model are personalized to help Yanelis work on and make progress with treatment goals. Culturally and trauma informed care principles are incorporated to contextualize therapeutic strategies to best fit the life experiences and views of Yanelis as indicated.     Based on above ratings and patient preference, Yanelis was guided to work on personalized psychotherapeutic modules within identified Health Practices that are italicized below to improve daily functioning and overall mental health and wellbeing. Instruction, training, guidance, along with traditional therapeutic processing, is used to promote growth in these areas for Yanelis.      Soothe Health Practice: Being Calm and Managing Your Emotions   Calm Your Body and Mind Module: Calming your body and mind whenever you are stressed (cognitive behavioral therapy and acceptance commitment therapy)   Practice Meditation Module: Being calmer by practicing meditation for a few moments each day (mindfulness, cognitive behavioral therapy, and positive psychology)  Embrace Discomfort Module: Using skills to cope with distress (cognitive behavioral therapy and acceptance commitment therapy)   Notes: Reviewed, discussed, and plan for how Christel can utilize a simple breathing meditation, which she thinks will help her with her stress and her physical symptoms related to POTS; indicated intention to make this part of her morning routine    Move Health Practice: Being Physically Active  Be Active Module: Being more active each day (habit formation and positive psychology)  Be Strong and Mobile Module: Building your strength, flexibility, and balance (habit formation and positive psychology)  Be Fit Module: Building your physical fitness (habit formation and positive psychology)   Notes: Reviewed and reinforced Yanelis again or working on being more active  and less sedentary; noted doing better with her stretching routine during the week    Activate Health Practice: Being Responsible and Successful at Home, Work, and/or School  Conquer Avoidance Module: Avoiding less and actively doing more of what matters each day (behavioral activation and cognitive behavioral therapy)   Accomplish More Module: Doing what needs to be done each day (executive functioning skills, behavioral activation, and habit formation)  Solve Problems Module: Overcome everyday problems to be more successful in what matters to you (executive functioning skills and cognitive behavioral therapy)  Notes: Reviewed and reinforced Yanelis again being aware and redirecting avoiding to behavioral activation strategies; still working on being activated in cleaning and organizing her room in small increments; still working on eating lunch between the hour of 1 to 2 PM; discussed scheduling all of her activation plans and she agreed she will plan the day as part of her daily morning routine        MENTAL STATUS EXAM     Yanelis was observed for the following indicators of mental status.     Appearance/Behavior: Adequate grooming, appropriate attire, good eye contact, calm, cooperative, no abnormal movements   Speech: Rate, volume and tone appropriate; no push or pressure; no rapid speech     Mood/Affect: Calm, affect congruent to situation   Thought Process/Content: Coherent, linear; no reports or evidence of auditory hallucinations; no reports or evidence of visual hallucinations    Thought Associations: Logical, no looseness of associations, no flight of ideas, no tangentially, no circumstantiality   Insight/Judgment: Adequate insight into condition and need for treatment; judgement not impaired   Orientation: Alert and oriented to person place, time, and circumstance   Recent and Past Memory: Good    Attention Span/Concentration: Good     Language: English with estimated vocabulary to be average or  above   Fund of Knowledge: Estimated to be average or above fund of knowledge      SAFETY PLAN - NA, no SI or SIB     RECOMMENDATIONS     Based on a medical records review and the results of this encounter, it is recommended that Yanelis participate in the following behavioral health services.      Mental Health Outpatient Psychotherapy: Individual-focused skills and habit training with therapeutic processing to improve mental health and wellbeing, conducted by Dr. Vega   Psychiatric Evaluation and Services: Continue consulting with Dr. Driscoll for psychiatric care including psychiatric diagnostic clarification, medication management, care coordination, and psychotherapy as needed    Primary and Specialty Care Services: Continue consulting with specialty care providers for cancer and PCP for healthcare, medication management, and care coordination as needed        DISPOSITION AND PLAN      Psychotherapy was conducted with Yanelis to make progress on treatment goals, enhance daily functioning, and improve mental health and wellbeing. Motivational interviewing was used to promote insight and engagement and to personalize psychotherapy interventions. If applicable, a safety plan was formulated and reviewed with Yanelis.      The engagement of Yanelis during the session is assessed by this provider as: High     The progress of Etienne on treatment plan goals and objectives is assessed by this provider as: Moderate        In line with feedback informed therapy, Yanelis was guided to complete ratings of perceived Helpfulness and Hopefulness regarding the current encounter, and the results and are recorded below. The results were collaboratively discussed to make adjustments or improvements in subsequent psychotherapy encounters.      Degree of helpfulness of this visit on a scale ranging from 0 is Not Helpful to 10 is Very Helpful - Historical to Current: Did not review  Degree of hopefulness in  improving mental health and wellbeing on a scale ranging from 0 Not Hopeful to 10 Very Hopeful - Historical to Current: Did not review    The plan is for Yanelis to follow up with this provider for outpatient psychotherapy and will seek additional recommended services as indicated to continue work on treatment goals.     If there are any questions regarding this information please contact the [facility] at [phone].    David Vega, PhD, LP   Professor of Psychiatry and Behavioral Sciences  [facility]

## 2024-10-10 ENCOUNTER — VIRTUAL VISIT (OUTPATIENT)
Dept: PSYCHIATRY | Facility: CLINIC | Age: 49
End: 2024-10-10
Attending: PSYCHOLOGIST
Payer: COMMERCIAL

## 2024-10-10 DIAGNOSIS — F41.9 ANXIETY DISORDER, UNSPECIFIED TYPE: ICD-10-CM

## 2024-10-10 DIAGNOSIS — F33.0 MILD EPISODE OF RECURRENT MAJOR DEPRESSIVE DISORDER (H): ICD-10-CM

## 2024-10-10 DIAGNOSIS — F90.0 ATTENTION DEFICIT HYPERACTIVITY DISORDER (ADHD), PREDOMINANTLY INATTENTIVE TYPE: Primary | ICD-10-CM

## 2024-10-10 PROCEDURE — 90837 PSYTX W PT 60 MINUTES: CPT | Mod: 95 | Performed by: PSYCHOLOGIST

## 2024-10-10 ASSESSMENT — PATIENT HEALTH QUESTIONNAIRE - PHQ9
SUM OF ALL RESPONSES TO PHQ QUESTIONS 1-9: 13
SUM OF ALL RESPONSES TO PHQ QUESTIONS 1-9: 13
10. IF YOU CHECKED OFF ANY PROBLEMS, HOW DIFFICULT HAVE THESE PROBLEMS MADE IT FOR YOU TO DO YOUR WORK, TAKE CARE OF THINGS AT HOME, OR GET ALONG WITH OTHER PEOPLE: VERY DIFFICULT

## 2024-10-10 NOTE — PROGRESS NOTES
[facility]  [address], [address]  [place] [address]  [phone]     OUTPATIENT PSYCHOTHERAPY ENCOUNTER      PATIENT     Name: Yanelis Lyons  YOB: 1975     SERVICES PROVIDED    Date of Service: 10/10/2024   Time of Service: 202 to 301 pm (59 Minutes)   Type of Service: 29105 Individual Psychotherapy (53+ min)   Service Provider: David Vega, PhD, LP     TELEMEDICINE ENCOUNTER METHODS     Telemedicine psychotherapy was conducted due to the preference Yanelis during scheduling. The patient's condition was safely assessed and treated via synchronous audio and visual telemedicine encounter. A secure real time interactive audio and visual telecommunication system (videoconference via [employer]) was used for the visit.     Patient Visit Location: Home in [place]      Present For Encounter: Yanelis     Provider Visit Location: HIPAA compliant location within provider home       ENCOUNTER SYNOPSIS     Yanelis participated in a psychotherapy session today with David Vega, PhD, LP. Background information, history, current condition, and available medical record notes were reviewed, and a brief clinical interview was conducted with Yanelis to update the treatment focus and planning as indicated. There was opportunity for Yanelis to discuss and process recent life happenings. The session included providing Yanelis with an evidence-informed, strengths-based, whole-person mental health and wellbeing-focused model of psychotherapy (see Treatment Plan and Psychotherapy Interventions). Coordination of care was also planned with other healthcare providers working with Yanelis if indicated.     BACKGROUND INFORMATION (From 7/8/2024 Intake; Updated 7/18/2024)      Yanelis lives with her father and brother.  She has a BA degree in physics and a BA in English. 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    Yanelis is currently unemployed and her more recent work history is \"spotty.\"  She has reportedly been fired from many jobs due to ADHD symptoms.  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    Yanelis was diagnosed with breast cancer in 2022.  She was successfully treated with chemotherapy, mastectomy, and radiation.    Yanelis is receiving mental health care.  She receives psychiatric care from Dr. Leora Driscoll.  Her diagnosis is reported to be ADHD and she is currently on Adderall.  In addition, she participates in ADHD support groups.    Currently, Yanelis is seeking ADHD informed psychotherapy with the current provider.  Her goal is to become \"reliable and productive again\" and learn how to deal with her executive functioning challenges.    Psychiatric Symptoms         The following symptoms and concerns are to be targeted in the treatment of Yanelis.      Inattention/Hyperactivity/Impulsivity: Reported she was diagnosed with ADHD at the age of 34; even though she is on Adderall, Yanelis reported current symptoms of inattention and to some degree also hyperactivity/impulsivity; in particular she gets overwhelmed with work/tasks and shuts down; indicated her executive functioning is poor and especially being disorganized, having a hard time staying focused, and getting frustrated a lot   Depression: Reported mild recurrent depressive symptoms including recently sadness, irritability, decreased energy, low motivation, poor concentration, sleep disturbance, negative thoughts    Hypomania/Josephine: None reported    Anxiety: Reported recurrent symptoms of worrying, ruminating, nervousness, heightened physical tension, avoidance     Panic: None reported   Obsessions/Compulsions: None reported   Post-Traumatic Stress: None reported   Sleep " "Problems and Fatigue: Reported her sleep is a \"mess\" since being treated for cancer with side effects coming from the many medications she has been on; noted she uses trazodone intermittently for sleep as needed; had 2 prior sleep studies and was determined to be \"normal\" without apnea or restlessness  Alcohol Abuse: None reported   Drug Abuse: None reported     Psychotic: None reported     Suicidal Thoughts: None reported recently; had suicidal ideation as a teenager    Cutting/Self-Injury: None reported         Functional Concerns      The following functional concerns are to be targeted in the treatment of Yanelis.      Home: Reported struggling with household tasks and upkeep      School: Reported having difficulty being organized and completing homework and academic tasks in high school and college      Work: Reported is distractible, off task, forgets things, etc. on the job and she has been fired from several jobs as a result    Financial: Reported prior history of significant credit card debt due to overspending        History     The following reflect pertinent historical findings identified for Yanelis.      Developmental Delays: None reported    Family Problems: Reported that her mother was impatient with her growing up and often yelled at her; indicated that her mother passed away in 2021 from a lung disease; observed that her mother was \"the center of the family\" and the family is struggling without her    Child Abuse/Neglect/Trauma: None reported    Educational Problems: None reported    Mental Health Problems: Reported had undiagnosed depression during her teenage years and that she was officially diagnosed while she attended college; noted she has been struggling with recurrent depressive symptoms throughout adulthood    Physical Health Problems: Reported prior history of breast cancer that was successfully treated and is currently in remission; noted recently being diagnosed with postural " orthostatic tachycardia syndrome (POTS)  Family History of Mental Health Problems: Reported suspicion that her father has ADHD but this has not been officially diagnosed; believes her brother is depressed     Other Problems: None reported      Strengths and Protective Factors      The following reflect strengths and protective factors that could be leveraged in treatment of Yanelis.      Personal: Reported she is persistent and that she is good with knitting, sewing, cross stitching, and painting     Support System: Reported to be her father and brother      CHECK-IN FOR THIS ENCOUNTER     Recent Happenings     Checked in with Yanelis to review how it has been going recently     Noteworthy Events: Reported routine life events   Noteworthy Stressors/Problems: Reported getting her plumbing fixed which reduced some of her stress            Recent Health Concerns     Checked in with Yanelis to review current health concerns and rated them ranging from 0 = Currently Low Concern to 10 = Currently Very High Concern.      Emotional Health Concerns: Current level of concern about overall distress, anxiety, depression, or stress - Historical to Current: Moderate; moderate; moderate; moderate to high; moderate; moderate; moderate to high; moderate  Notes: Reported being somewhat anxious and depressed and has been thinking a lot about her mother          Patient Health Questionnaire 9 (Submitted on 10/10/2024)  Total Score: 13 (moderate depression)   Physical Health Concerns: Current level of concern about overall physical health, physical activity, body weight, health habits, or pain - Historical to Current: Moderate; moderate; moderate; moderate; moderate; low to moderate; moderate; low  Notes: Reported again feeling generally healthy       Living Health Concerns: Current level of concern about functioning at home, at school and/or work, and in important relationships - Historical to Current: NA  Notes: Did not  review    Recent Safety Concerns    Self: None reported   Others: None reported       TREATMENT PLAN AND PSYCHOTHERAPY UPDATE FOR THIS ENCOUNTER     Diagnoses Being Treated      Yanelis qualifies for diagnoses of ADHD-I; major depression, recurrent, mild; anxiety disorder, unspecified     Treatment Symptoms Goals Addressed and Progress      Based on the Background Information above, the following personalized psychiatric symptom domains for Yanelis are targeted for improvement through psychotherapy. The goal is to achieve an 8 or higher over the course of treatment. Progress to date ranges from 0 Limited to 5 Some to 10 Significant. This progress is episodically reviewed with Yanelis to make treatment decisions.     Improve Understanding of Condition/Illness: Baseline estimated to be 6; 7  Reduce Inattention: Baseline estimated to be 5; 6  Reduce Depression: Baseline estimated to be 5; 6  Reduce Anxiety: Baseline estimated to be 6; 6  Reduce Sleep Problems and Fatigue: Baseline estimated to be 5; 6    Functional Goals Addressed and Progress      Based on the Background Information above, the following personalized functional domains for Yanelis are targeted for improvement through psychotherapy. The goal is to achieve an 8 or higher over the course of treatment. Progress to date ranges from 0 Limited to 5 Some to 10 Significant. This progress is episodically reviewed with Yanelis to make treatment decisions.     Home: Baseline estimated to be 5; 6  School: NA currently  Work: Baseline estimated to be 4; NA currently  Financial: NA currently    Mental Health and Wellbeing Health Practices Addressed and Progress     Using a brief psychotherapy collaborative decision aid, Yanelis was guided to make self-ratings for perceived level of mastery in 12 Health Practices that are aligned with Four Worlds of Mental Health and Wellbeing. Ratings range from 0 = Not Good at This to 10 = Great at This. Ratings below  were recorded on 7/8/2024.      Internal World: Regulation of Emotions, Thoughts, and Stress   Soothe Health Practice: I am calm and manage my emotions - Historical to Current: 5.5  Think Health Practice: I have positive, hopeful and helpful thoughts - Historical to Current: 5 (indicated being self-critical)  Rest Health Practice: I am well rested and energized - Historical to Current: 4    Physical World: Physical Health      Hydrate Health Practice: I drink enough water every day and stay hydrated - Historical to Current: 6  Nourish Health Practice: I eat healthy - Historical to Current: 4 (indicated does not eat breakfast)  Move Health Practice: I am physically active - Historical to Current: 2    External World: Social Relationships and Productive Behaviors   Express Health Practice: I listen to others and express myself well  - Historical to Current: 8  Connect Health Practice: I have positive, healthy and meaningful relationships - Historical to Current: 7  Activate Health Practice: I am responsible and successful at home, work, school - Historical to Current: 3      Spiritual World: Contemplation, Purpose and Meaning, and Living According to Beliefs   Observe Health Practice: I am thankful, accepting, and live in the moment - Historical to Current: 7.5  Seek Health Practice: I do things that give me meaning and purpose - Historical to Current: 7.5  Believe Health Practice: My values and beliefs guide how I am living - Historical to Current: 7.5    Psychotherapy Interventions Provided to Improve Symptoms, Functioning, and Mental Health and Wellbeing      Most the session focused on reviewing, discussing, and processing Yanelis's feelings and thoughts related to loss.  Much of this centered on grieving and still missing her mother who passed away.  Indicates feeling sad and having unresolved issues with her mother.  Felt like she was a disappointment to her mother at times.  Also noted though that there were  positive moments of laughter and closeness.  Noted there was a lack of closure which was difficult.  Supported and validated Yanelis discussing her mother.    Also reflected on the loss of her breast due to mastectomy.  Noted it felt unreal and still shocking that she no longer has her breast.  Discussed how she is still grieving at.  She is reconsidering having reconstructive surgery in the future.  Supported and validated Yanelis discussing her mastectomy.    This session also focused on a strengths-based, whole-person mental health and wellbeing-focused model of psychotherapy for Christel. This approach incorporates empirically validated practice elements derived from cognitive behavioral therapy, acceptance and commitment therapy, positive psychology, mindfulness, and habit formation science. Specific psychotherapeutic modules within the model are personalized to help Yanelis work on and make progress with treatment goals. Culturally and trauma informed care principles are incorporated to contextualize therapeutic strategies to best fit the life experiences and views of Yanelis as indicated.     Based on above ratings and patient preference, Yanelis was guided to work on personalized psychotherapeutic modules within identified Health Practices that are italicized below to improve daily functioning and overall mental health and wellbeing. Instruction, training, guidance, along with traditional therapeutic processing, is used to promote growth in these areas for Yanelis.      Soothe Health Practice: Being Calm and Managing Your Emotions   Calm Your Body and Mind Module: Calming your body and mind whenever you are stressed (cognitive behavioral therapy and acceptance commitment therapy)   Practice Meditation Module: Being calmer by practicing meditation for a few moments each day (mindfulness, cognitive behavioral therapy, and positive psychology)  Embrace Discomfort Module: Using skills to cope with  distress (cognitive behavioral therapy and acceptance commitment therapy)   Notes: Reviewed and reinforced Yanelis to utilize a simple breathing meditation during her morning routine     Move Health Practice: Being Physically Active  Be Active Module: Being more active each day (habit formation and positive psychology)  Be Strong and Mobile Module: Building your strength, flexibility, and balance (habit formation and positive psychology)  Be Fit Module: Building your physical fitness (habit formation and positive psychology)   Notes: Reviewed and reinforced Yanelis for working on being more active and less sedentary including her stretching routine during the week    Activate Health Practice: Being Responsible and Successful at Home, Work, and/or School  Conquer Avoidance Module: Avoiding less and actively doing more of what matters each day (behavioral activation and cognitive behavioral therapy)   Accomplish More Module: Doing what needs to be done each day (executive functioning skills, behavioral activation, and habit formation)  Solve Problems Module: Overcome everyday problems to be more successful in what matters to you (executive functioning skills and cognitive behavioral therapy)  Notes: Will review and reinforced Yanelis during the next session for behavioral activation related to cleaning/organizing her room in small increments, eating lunch between the hour of 1 to 2 PM     MENTAL STATUS EXAM     Yanelis was observed for the following indicators of mental status.     Appearance/Behavior: Adequate grooming, appropriate attire, good eye contact, calm, cooperative, no abnormal movements   Speech: Rate, volume and tone appropriate; no push or pressure; no rapid speech     Mood/Affect: Calm, affect congruent to situation   Thought Process/Content: Coherent, linear; no reports or evidence of auditory hallucinations; no reports or evidence of visual hallucinations    Thought Associations: Logical, no  looseness of associations, no flight of ideas, no tangentially, no circumstantiality   Insight/Judgment: Adequate insight into condition and need for treatment; judgement not impaired   Orientation: Alert and oriented to person place, time, and circumstance   Recent and Past Memory: Good    Attention Span/Concentration: Good     Language: English with estimated vocabulary to be average or above   Fund of Knowledge: Estimated to be average or above fund of knowledge      SAFETY PLAN - NA, no SI or SIB     RECOMMENDATIONS     Based on a medical records review and the results of this encounter, it is recommended that Yanelis participate in the following behavioral health services.      Mental Health Outpatient Psychotherapy: Individual-focused skills and habit training with therapeutic processing to improve mental health and wellbeing, conducted by Dr. Vega   Psychiatric Evaluation and Services: Continue consulting with Dr. Driscoll for psychiatric care including psychiatric diagnostic clarification, medication management, care coordination, and psychotherapy as needed    Primary and Specialty Care Services: Continue consulting with specialty care providers for cancer and PCP for healthcare, medication management, and care coordination as needed        DISPOSITION AND PLAN      Psychotherapy was conducted with Yanelis to make progress on treatment goals, enhance daily functioning, and improve mental health and wellbeing. Motivational interviewing was used to promote insight and engagement and to personalize psychotherapy interventions. If applicable, a safety plan was formulated and reviewed with Yanelis.      The engagement of Yanelis during the session is assessed by this provider as: High     The progress of Etienne on treatment plan goals and objectives is assessed by this provider as: Moderate        In line with feedback informed therapy, Yanelis was guided to complete ratings of perceived  Helpfulness and Hopefulness regarding the current encounter, and the results and are recorded below. The results were collaboratively discussed to make adjustments or improvements in subsequent psychotherapy encounters.      Degree of helpfulness of this visit on a scale ranging from 0 is Not Helpful to 10 is Very Helpful - Historical to Current: Did not review  Degree of hopefulness in improving mental health and wellbeing on a scale ranging from 0 Not Hopeful to 10 Very Hopeful - Historical to Current: Did not review    The plan is for Yanelis to follow up with this provider for outpatient psychotherapy and will seek additional recommended services as indicated to continue work on treatment goals.     If there are any questions regarding this information please contact the [facility] at [phone].    David Vega, PhD, LP   Professor of Psychiatry and Behavioral Sciences  [facility]

## 2024-10-17 ENCOUNTER — VIRTUAL VISIT (OUTPATIENT)
Dept: PSYCHIATRY | Facility: CLINIC | Age: 49
End: 2024-10-17
Attending: PSYCHOLOGIST
Payer: COMMERCIAL

## 2024-10-17 DIAGNOSIS — F33.0 MILD EPISODE OF RECURRENT MAJOR DEPRESSIVE DISORDER (H): ICD-10-CM

## 2024-10-17 DIAGNOSIS — F41.9 ANXIETY DISORDER, UNSPECIFIED TYPE: ICD-10-CM

## 2024-10-17 DIAGNOSIS — F90.0 ATTENTION DEFICIT HYPERACTIVITY DISORDER (ADHD), PREDOMINANTLY INATTENTIVE TYPE: Primary | ICD-10-CM

## 2024-10-17 PROCEDURE — 90837 PSYTX W PT 60 MINUTES: CPT | Mod: 95 | Performed by: PSYCHOLOGIST

## 2024-10-17 ASSESSMENT — PATIENT HEALTH QUESTIONNAIRE - PHQ9
SUM OF ALL RESPONSES TO PHQ QUESTIONS 1-9: 11
10. IF YOU CHECKED OFF ANY PROBLEMS, HOW DIFFICULT HAVE THESE PROBLEMS MADE IT FOR YOU TO DO YOUR WORK, TAKE CARE OF THINGS AT HOME, OR GET ALONG WITH OTHER PEOPLE: VERY DIFFICULT
SUM OF ALL RESPONSES TO PHQ QUESTIONS 1-9: 11

## 2024-10-17 NOTE — PROGRESS NOTES
[facility]  [address], [address]  [place] [address]  [phone]     OUTPATIENT PSYCHOTHERAPY ENCOUNTER      PATIENT     Name: Yanelis Lyons  YOB: 1975     SERVICES PROVIDED    Date of Service: 10/17/2024   Time of Service: 206 to 305 pm (58 Minutes)   Type of Service: 25542 Individual Psychotherapy (53+ min)   Service Provider: David Vega, PhD, LP     TELEMEDICINE ENCOUNTER METHODS     Telemedicine psychotherapy was conducted due to the preference Yanelis during scheduling. The patient's condition was safely assessed and treated via synchronous audio and visual telemedicine encounter. A secure real time interactive audio and visual telecommunication system (videoconference via [employer]) was used for the visit.     Patient Visit Location: Home in [place]      Present For Encounter: Yanelis     Provider Visit Location: HIPAA compliant location within provider home       ENCOUNTER SYNOPSIS     Yanelis participated in a psychotherapy session today with David Vega, PhD, LP. Background information, history, current condition, and available medical record notes were reviewed, and a brief clinical interview was conducted with Yanelis to update the treatment focus and planning as indicated. There was opportunity for Yanelis to discuss and process recent life happenings. The session included providing Yanelis with an evidence-informed, strengths-based, whole-person mental health and wellbeing-focused model of psychotherapy (see Treatment Plan and Psychotherapy Interventions). Coordination of care was also planned with other healthcare providers working with Yanelis if indicated.     BACKGROUND INFORMATION (From 7/8/2024 Intake; Updated 7/18/2024)      Yanelis lives with her father and brother.  She has a BA degree in physics and a BA in English. 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    Yanelis is currently unemployed and her more recent work history is \"spotty.\"  She has reportedly been fired from many jobs due to ADHD symptoms.  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    Yanelis was diagnosed with breast cancer in 2022.  She was successfully treated with chemotherapy, mastectomy, and radiation.    Yanelis is receiving mental health care.  She receives psychiatric care from Dr. Leora Driscoll.  Her diagnosis is reported to be ADHD and she is currently on Adderall.  In addition, she participates in ADHD support groups.    Currently, Yanelis is seeking ADHD informed psychotherapy with the current provider.  Her goal is to become \"reliable and productive again\" and learn how to deal with her executive functioning challenges.    Psychiatric Symptoms         The following symptoms and concerns are to be targeted in the treatment of Yanelis.      Inattention/Hyperactivity/Impulsivity: Reported she was diagnosed with ADHD at the age of 34; even though she is on Adderall, Yanelis reported current symptoms of inattention and to some degree also hyperactivity/impulsivity; in particular she gets overwhelmed with work/tasks and shuts down; indicated her executive functioning is poor and especially being disorganized, having a hard time staying focused, and getting frustrated a lot   Depression: Reported mild recurrent depressive symptoms including recently sadness, irritability, decreased energy, low motivation, poor concentration, sleep disturbance, negative thoughts    Hypomania/Josephine: None reported    Anxiety: Reported recurrent symptoms of worrying, ruminating, nervousness, heightened physical tension, avoidance     Panic: None reported   Obsessions/Compulsions: None reported   Post-Traumatic Stress: None reported   Sleep " "Problems and Fatigue: Reported her sleep is a \"mess\" since being treated for cancer with side effects coming from the many medications she has been on; noted she uses trazodone intermittently for sleep as needed; had 2 prior sleep studies and was determined to be \"normal\" without apnea or restlessness  Alcohol Abuse: None reported   Drug Abuse: None reported     Psychotic: None reported     Suicidal Thoughts: None reported recently; had suicidal ideation as a teenager    Cutting/Self-Injury: None reported         Functional Concerns      The following functional concerns are to be targeted in the treatment of Yanelis.      Home: Reported struggling with household tasks and upkeep      School: Reported having difficulty being organized and completing homework and academic tasks in high school and college      Work: Reported is distractible, off task, forgets things, etc. on the job and she has been fired from several jobs as a result    Financial: Reported prior history of significant credit card debt due to overspending        History     The following reflect pertinent historical findings identified for Yanelis.      Developmental Delays: None reported    Family Problems: Reported that her mother was impatient with her growing up and often yelled at her; indicated that her mother passed away in 2021 from a lung disease; observed that her mother was \"the center of the family\" and the family is struggling without her    Child Abuse/Neglect/Trauma: None reported    Educational Problems: None reported    Mental Health Problems: Reported had undiagnosed depression during her teenage years and that she was officially diagnosed while she attended college; noted she has been struggling with recurrent depressive symptoms throughout adulthood    Physical Health Problems: Reported prior history of breast cancer that was successfully treated and is currently in remission; noted recently being diagnosed with postural " orthostatic tachycardia syndrome (POTS)  Family History of Mental Health Problems: Reported suspicion that her father has ADHD but this has not been officially diagnosed; believes her brother is depressed     Other Problems: None reported      Strengths and Protective Factors      The following reflect strengths and protective factors that could be leveraged in treatment of Yanelis.      Personal: Reported she is persistent and that she is good with knitting, sewing, cross stitching, and painting     Support System: Reported to be her father and brother      CHECK-IN FOR THIS ENCOUNTER     Recent Happenings     Checked in with Yanelis to review how it has been going recently     Noteworthy Events: Reported routine life events   Noteworthy Stressors/Problems: Reported no particular stressors or problems lately             Recent Health Concerns     Checked in with Yanelis to review current health concerns and rated them ranging from 0 = Currently Low Concern to 10 = Currently Very High Concern.      Emotional Health Concerns: Current level of concern about overall distress, anxiety, depression, or stress - Historical to Current: Moderate; moderate; moderate; moderate to high; moderate; moderate; moderate to high; moderate; moderate  Notes: Reported again being somewhat anxious and depressed and again has been thinking a lot about her mother; noted sleeping too much lately         Patient Health Questionnaire 9 (Submitted on 10/17/2024)  Total Score: 11 (moderate depression)   Physical Health Concerns: Current level of concern about overall physical health, physical activity, body weight, health habits, or pain - Historical to Current: Moderate; moderate; moderate; moderate; moderate; low to moderate; moderate; low; low  Notes: Reported again feeling generally healthy       Living Health Concerns: Current level of concern about functioning at home, at school and/or work, and in important relationships -  Historical to Current: NA  Notes: Did not review    Recent Safety Concerns    Self: None reported   Others: None reported       TREATMENT PLAN AND PSYCHOTHERAPY UPDATE FOR THIS ENCOUNTER     Diagnoses Being Treated      Yanelis qualifies for diagnoses of ADHD-I; major depression, recurrent, mild; anxiety disorder, unspecified     Treatment Symptoms Goals Addressed and Progress      Based on the Background Information above, the following personalized psychiatric symptom domains for Yanelis are targeted for improvement through psychotherapy. The goal is to achieve an 8 or higher over the course of treatment. Progress to date ranges from 0 Limited to 5 Some to 10 Significant. This progress is episodically reviewed with Yanelis to make treatment decisions.     Improve Understanding of Condition/Illness: Baseline estimated to be 6; 7  Reduce Inattention: Baseline estimated to be 5; 6  Reduce Depression: Baseline estimated to be 5; 6  Reduce Anxiety: Baseline estimated to be 6; 6  Reduce Sleep Problems and Fatigue: Baseline estimated to be 5; 6    Functional Goals Addressed and Progress      Based on the Background Information above, the following personalized functional domains for Yanelis are targeted for improvement through psychotherapy. The goal is to achieve an 8 or higher over the course of treatment. Progress to date ranges from 0 Limited to 5 Some to 10 Significant. This progress is episodically reviewed with Yanelis to make treatment decisions.     Home: Baseline estimated to be 5; 6  School: NA currently  Work: Baseline estimated to be 4; NA currently  Financial: NA currently    Mental Health and Wellbeing Health Practices Addressed and Progress     Using a brief psychotherapy collaborative decision aid, Yanelis was guided to make self-ratings for perceived level of mastery in 12 Health Practices that are aligned with Four Worlds of Mental Health and Wellbeing. Ratings range from 0 = Not Good at  This to 10 = Great at This. Ratings below were recorded on 7/8/2024.      Internal World: Regulation of Emotions, Thoughts, and Stress   Soothe Health Practice: I am calm and manage my emotions - Historical to Current: 5.5  Think Health Practice: I have positive, hopeful and helpful thoughts - Historical to Current: 5 (indicated being self-critical)  Rest Health Practice: I am well rested and energized - Historical to Current: 4    Physical World: Physical Health      Hydrate Health Practice: I drink enough water every day and stay hydrated - Historical to Current: 6  Nourish Health Practice: I eat healthy - Historical to Current: 4 (indicated does not eat breakfast)  Move Health Practice: I am physically active - Historical to Current: 2    External World: Social Relationships and Productive Behaviors   Express Health Practice: I listen to others and express myself well  - Historical to Current: 8  Connect Health Practice: I have positive, healthy and meaningful relationships - Historical to Current: 7  Activate Health Practice: I am responsible and successful at home, work, school - Historical to Current: 3      Spiritual World: Contemplation, Purpose and Meaning, and Living According to Beliefs   Observe Health Practice: I am thankful, accepting, and live in the moment - Historical to Current: 7.5  Seek Health Practice: I do things that give me meaning and purpose - Historical to Current: 7.5  Believe Health Practice: My values and beliefs guide how I am living - Historical to Current: 7.5    Psychotherapy Interventions Provided to Improve Symptoms, Functioning, and Mental Health and Wellbeing      Yanelis indicated she will seek out a sleep specialist practitioner, possibly the sleep clinic at [facility].  This is because she has been sleeping too much.  Noted that she had a sleep apnea study in the past that was negative but she is not sure that this is accurate.  Encouraged Yanelis to follow through with this  sleep specialist appointment.    Focused again on reviewing, discussing, and processing Yanelis's feelings and thoughts related to her mother and her cancer struggle.  Noted missing her mother and was grieving the loss of her body functioning that and as a result of her mastectomy.  Supported and validated Yanelis discussing these issues.      This session also focused on a strengths-based, whole-person mental health and wellbeing-focused model of psychotherapy for Christel. This approach incorporates empirically validated practice elements derived from cognitive behavioral therapy, acceptance and commitment therapy, positive psychology, mindfulness, and habit formation science. Specific psychotherapeutic modules within the model are personalized to help Yanelis work on and make progress with treatment goals. Culturally and trauma informed care principles are incorporated to contextualize therapeutic strategies to best fit the life experiences and views of Yanelis as indicated.     Based on above ratings and patient preference, Yanelis was guided to work on personalized psychotherapeutic modules within identified Health Practices that are italicized below to improve daily functioning and overall mental health and wellbeing. Instruction, training, guidance, along with traditional therapeutic processing, is used to promote growth in these areas for Yanelis.      Soothe Health Practice: Being Calm and Managing Your Emotions   Calm Your Body and Mind Module: Calming your body and mind whenever you are stressed (cognitive behavioral therapy and acceptance commitment therapy)   Practice Meditation Module: Being calmer by practicing meditation for a few moments each day (mindfulness, cognitive behavioral therapy, and positive psychology)  Embrace Discomfort Module: Using skills to cope with distress (cognitive behavioral therapy and acceptance commitment therapy)   Notes: Reviewed and reinforced Yanelis to  utilize breathing meditation      Move Health Practice: Being Physically Active  Be Active Module: Being more active each day (habit formation and positive psychology)  Be Strong and Mobile Module: Building your strength, flexibility, and balance (habit formation and positive psychology)  Be Fit Module: Building your physical fitness (habit formation and positive psychology)   Notes: Reviewed and reinforced Yanelis for working on being more active and less sedentary including her stretching routine during the week    Activate Health Practice: Being Responsible and Successful at Home, Work, and/or School  Conquer Avoidance Module: Avoiding less and actively doing more of what matters each day (behavioral activation and cognitive behavioral therapy)   Accomplish More Module: Doing what needs to be done each day (executive functioning skills, behavioral activation, and habit formation)  Solve Problems Module: Overcome everyday problems to be more successful in what matters to you (executive functioning skills and cognitive behavioral therapy)  Notes: Will review and reinforce Yanelis during the next session for behavioral activation related to cleaning/organizing her room in small increments, eating lunch between the hour of 1 to 2 PM     MENTAL STATUS EXAM     Yanelis was observed for the following indicators of mental status.     Appearance/Behavior: Adequate grooming, appropriate attire, good eye contact, calm, cooperative, no abnormal movements   Speech: Rate, volume and tone appropriate; no push or pressure; no rapid speech     Mood/Affect: Calm, affect congruent to situation   Thought Process/Content: Coherent, linear; no reports or evidence of auditory hallucinations; no reports or evidence of visual hallucinations    Thought Associations: Logical, no looseness of associations, no flight of ideas, no tangentially, no circumstantiality   Insight/Judgment: Adequate insight into condition and need for  treatment; judgement not impaired   Orientation: Alert and oriented to person place, time, and circumstance   Recent and Past Memory: Good    Attention Span/Concentration: Good     Language: English with estimated vocabulary to be average or above   Fund of Knowledge: Estimated to be average or above fund of knowledge      SAFETY PLAN - NA, no SI or SIB     RECOMMENDATIONS     Based on a medical records review and the results of this encounter, it is recommended that Yanelis participate in the following behavioral health services.      Mental Health Outpatient Psychotherapy: Individual-focused skills and habit training with therapeutic processing to improve mental health and wellbeing, conducted by Dr. Vega   Psychiatric Evaluation and Services: Continue consulting with Dr. Driscoll for psychiatric care including psychiatric diagnostic clarification, medication management, care coordination, and psychotherapy as needed    Primary and Specialty Care Services: Continue consulting with specialty care providers for cancer and PCP for healthcare, medication management, and care coordination as needed        DISPOSITION AND PLAN      Psychotherapy was conducted with Yanelis to make progress on treatment goals, enhance daily functioning, and improve mental health and wellbeing. Motivational interviewing was used to promote insight and engagement and to personalize psychotherapy interventions. If applicable, a safety plan was formulated and reviewed with Yanelis.      The engagement of Yanelis during the session is assessed by this provider as: High     The progress of Etienne on treatment plan goals and objectives is assessed by this provider as: Moderate        In line with feedback informed therapy, Yanelis was guided to complete ratings of perceived Helpfulness and Hopefulness regarding the current encounter, and the results and are recorded below. The results were collaboratively discussed to make  adjustments or improvements in subsequent psychotherapy encounters.      Degree of helpfulness of this visit on a scale ranging from 0 is Not Helpful to 10 is Very Helpful - Historical to Current: Did not review  Degree of hopefulness in improving mental health and wellbeing on a scale ranging from 0 Not Hopeful to 10 Very Hopeful - Historical to Current: Did not review    The plan is for Yanelis to follow up with this provider for outpatient psychotherapy and will seek additional recommended services as indicated to continue work on treatment goals.     If there are any questions regarding this information please contact the [facility] at [phone].    David Vega, PhD, LP   Professor of Psychiatry and Behavioral Sciences  [facility]

## 2024-10-24 ENCOUNTER — VIRTUAL VISIT (OUTPATIENT)
Dept: PSYCHIATRY | Facility: CLINIC | Age: 49
End: 2024-10-24
Attending: PSYCHOLOGIST
Payer: COMMERCIAL

## 2024-10-24 DIAGNOSIS — F41.9 ANXIETY DISORDER, UNSPECIFIED TYPE: ICD-10-CM

## 2024-10-24 DIAGNOSIS — F90.0 ATTENTION DEFICIT HYPERACTIVITY DISORDER (ADHD), PREDOMINANTLY INATTENTIVE TYPE: Primary | ICD-10-CM

## 2024-10-24 DIAGNOSIS — F33.0 MILD EPISODE OF RECURRENT MAJOR DEPRESSIVE DISORDER (H): ICD-10-CM

## 2024-10-24 PROCEDURE — 90837 PSYTX W PT 60 MINUTES: CPT | Mod: 95 | Performed by: PSYCHOLOGIST

## 2024-10-24 ASSESSMENT — PATIENT HEALTH QUESTIONNAIRE - PHQ9
10. IF YOU CHECKED OFF ANY PROBLEMS, HOW DIFFICULT HAVE THESE PROBLEMS MADE IT FOR YOU TO DO YOUR WORK, TAKE CARE OF THINGS AT HOME, OR GET ALONG WITH OTHER PEOPLE: VERY DIFFICULT
SUM OF ALL RESPONSES TO PHQ QUESTIONS 1-9: 13
SUM OF ALL RESPONSES TO PHQ QUESTIONS 1-9: 13

## 2024-10-24 NOTE — PROGRESS NOTES
[facility]  [address], [address]  [place] [address]  [phone]     OUTPATIENT PSYCHOTHERAPY ENCOUNTER      PATIENT     Name: Yanelis Lyons  YOB: 1975     SERVICES PROVIDED    Date of Service: 10/24/2024   Time of Service: 210 to 309 pm (59 Minutes)   Type of Service: 84469 Individual Psychotherapy (53+ min)   Service Provider: David Vega, PhD, LP     TELEMEDICINE ENCOUNTER METHODS     Telemedicine psychotherapy was conducted due to the preference Yanelis during scheduling. The patient's condition was safely assessed and treated via synchronous audio and visual telemedicine encounter. A secure real time interactive audio and visual telecommunication system (videoconference via [employer]) was used for the visit.     Patient Visit Location: Home in [place]      Present For Encounter: Yanelis     Provider Visit Location: HIPAA compliant location within provider home       ENCOUNTER SYNOPSIS     Yanelis participated in a psychotherapy session today with David Vega, PhD, LP. Background information, history, current condition, and available medical record notes were reviewed, and a brief clinical interview was conducted with Yanelis to update the treatment focus and planning as indicated. There was opportunity for Yanelis to discuss and process recent life happenings. The session included providing Yanelis with an evidence-informed, strengths-based, whole-person mental health and wellbeing-focused model of psychotherapy (see Treatment Plan and Psychotherapy Interventions). Coordination of care was also planned with other healthcare providers working with Yanelis if indicated.     BACKGROUND INFORMATION (From 7/8/2024 Intake; Updated 7/18/2024)      Yanelis lives with her father and brother.  She has a BA degree in physics and a BA in English. 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    Yanelis is currently unemployed and her more recent work history is \"spotty.\"  She has reportedly been fired from many jobs due to ADHD symptoms.  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    Yanelis was diagnosed with breast cancer in 2022.  She was successfully treated with chemotherapy, mastectomy, and radiation.    Yanelis is receiving mental health care.  She receives psychiatric care from Dr. Leora Driscoll.  Her diagnosis is reported to be ADHD and she is currently on Adderall.  In addition, she participates in ADHD support groups.    Currently, Yanelis is seeking ADHD informed psychotherapy with the current provider.  Her goal is to become \"reliable and productive again\" and learn how to deal with her executive functioning challenges.    Psychiatric Symptoms         The following symptoms and concerns are to be targeted in the treatment of Yanelis.      Inattention/Hyperactivity/Impulsivity: Reported she was diagnosed with ADHD at the age of 34; even though she is on Adderall, Yanelis reported current symptoms of inattention and to some degree also hyperactivity/impulsivity; in particular she gets overwhelmed with work/tasks and shuts down; indicated her executive functioning is poor and especially being disorganized, having a hard time staying focused, and getting frustrated a lot   Depression: Reported mild recurrent depressive symptoms including recently sadness, irritability, decreased energy, low motivation, poor concentration, sleep disturbance, negative thoughts    Hypomania/Josephine: None reported    Anxiety: Reported recurrent symptoms of worrying, ruminating, nervousness, heightened physical tension, avoidance     Panic: None reported   Obsessions/Compulsions: None reported   Post-Traumatic Stress: None reported   Sleep " "Problems and Fatigue: Reported her sleep is a \"mess\" since being treated for cancer with side effects coming from the many medications she has been on; noted she uses trazodone intermittently for sleep as needed; had 2 prior sleep studies and was determined to be \"normal\" without apnea or restlessness  Alcohol Abuse: None reported   Drug Abuse: None reported     Psychotic: None reported     Suicidal Thoughts: None reported recently; had suicidal ideation as a teenager    Cutting/Self-Injury: None reported         Functional Concerns      The following functional concerns are to be targeted in the treatment of Yanelis.      Home: Reported struggling with household tasks and upkeep      School: Reported having difficulty being organized and completing homework and academic tasks in high school and college      Work: Reported is distractible, off task, forgets things, etc. on the job and she has been fired from several jobs as a result    Financial: Reported prior history of significant credit card debt due to overspending        History     The following reflect pertinent historical findings identified for Yanelis.      Developmental Delays: None reported    Family Problems: Reported that her mother was impatient with her growing up and often yelled at her; indicated that her mother passed away in 2021 from a lung disease; observed that her mother was \"the center of the family\" and the family is struggling without her    Child Abuse/Neglect/Trauma: None reported    Educational Problems: None reported    Mental Health Problems: Reported had undiagnosed depression during her teenage years and that she was officially diagnosed while she attended college; noted she has been struggling with recurrent depressive symptoms throughout adulthood    Physical Health Problems: Reported prior history of breast cancer that was successfully treated and is currently in remission; noted recently being diagnosed with postural " orthostatic tachycardia syndrome (POTS)  Family History of Mental Health Problems: Reported suspicion that her father has ADHD but this has not been officially diagnosed; believes her brother is depressed     Other Problems: None reported      Strengths and Protective Factors      The following reflect strengths and protective factors that could be leveraged in treatment of Yanelis.      Personal: Reported she is persistent and that she is good with knitting, sewing, cross stitching, and painting     Support System: Reported to be her father and brother      CHECK-IN FOR THIS ENCOUNTER     Recent Happenings     Checked in with Yanelis to review how it has been going recently     Noteworthy Events: Reported routine life events has been making medical appointments for the near future  Noteworthy Stressors/Problems: Reported no particular stressors or problems lately             Recent Health Concerns     Checked in with Yanelis to review current health concerns and rated them ranging from 0 = Currently Low Concern to 10 = Currently Very High Concern.      Emotional Health Concerns: Current level of concern about overall distress, anxiety, depression, or stress - Historical to Current: Moderate; moderate; moderate; moderate to high; moderate; moderate; moderate to high; moderate; moderate; low to moderate  Notes: Reported mood somewhat improved          Patient Health Questionnaire 9 (Submitted on 10/24/2024)  Total Score: 13 (moderate depression)   Physical Health Concerns: Current level of concern about overall physical health, physical activity, body weight, health habits, or pain - Historical to Current: Moderate; moderate; moderate; moderate; moderate; low to moderate; moderate; low; low; low  Notes: Reported again feeling generally healthy       Living Health Concerns: Current level of concern about functioning at home, at school and/or work, and in important relationships - Historical to Current:  NA  Notes: Did not review    Recent Safety Concerns    Self: None reported   Others: None reported       TREATMENT PLAN AND PSYCHOTHERAPY UPDATE FOR THIS ENCOUNTER     Diagnoses Being Treated      Yanelis qualifies for diagnoses of ADHD-I; major depression, recurrent, mild; anxiety disorder, unspecified     Treatment Symptoms Goals Addressed and Progress      Based on the Background Information above, the following personalized psychiatric symptom domains for Yanelis are targeted for improvement through psychotherapy. The goal is to achieve an 8 or higher over the course of treatment. Progress to date ranges from 0 Limited to 5 Some to 10 Significant. This progress is episodically reviewed with Yanelis to make treatment decisions.     Improve Understanding of Condition/Illness: Baseline estimated to be 6; 7; 8  Reduce Inattention: Baseline estimated to be 5; 6; 6  Reduce Depression: Baseline estimated to be 5; 6; 6  Reduce Anxiety: Baseline estimated to be 6; 6; 6  Reduce Sleep Problems and Fatigue: Baseline estimated to be 5; 6; 7    Functional Goals Addressed and Progress      Based on the Background Information above, the following personalized functional domains for Yanelis are targeted for improvement through psychotherapy. The goal is to achieve an 8 or higher over the course of treatment. Progress to date ranges from 0 Limited to 5 Some to 10 Significant. This progress is episodically reviewed with Yanelis to make treatment decisions.     Home: Baseline estimated to be 5; 6; 7  School: NA currently  Work: NA currently  Financial: NA currently    Mental Health and Wellbeing Health Practices Addressed and Progress     Using a brief psychotherapy collaborative decision aid, Yanelis was guided to make self-ratings for perceived level of mastery in 12 Health Practices that are aligned with Four Worlds of Mental Health and Wellbeing. Ratings range from 0 = Not Good at This to 10 = Great at This. Ratings  below were recorded on 7/8/2024.      Internal World: Regulation of Emotions, Thoughts, and Stress   Soothe Health Practice: I am calm and manage my emotions - Historical to Current: 5.5  Think Health Practice: I have positive, hopeful and helpful thoughts - Historical to Current: 5 (indicated being self-critical)  Rest Health Practice: I am well rested and energized - Historical to Current: 4    Physical World: Physical Health      Hydrate Health Practice: I drink enough water every day and stay hydrated - Historical to Current: 6  Nourish Health Practice: I eat healthy - Historical to Current: 4 (indicated does not eat breakfast)  Move Health Practice: I am physically active - Historical to Current: 2    External World: Social Relationships and Productive Behaviors   Express Health Practice: I listen to others and express myself well  - Historical to Current: 8  Connect Health Practice: I have positive, healthy and meaningful relationships - Historical to Current: 7  Activate Health Practice: I am responsible and successful at home, work, school - Historical to Current: 3      Spiritual World: Contemplation, Purpose and Meaning, and Living According to Beliefs   Observe Health Practice: I am thankful, accepting, and live in the moment - Historical to Current: 7.5  Seek Health Practice: I do things that give me meaning and purpose - Historical to Current: 7.5  Believe Health Practice: My values and beliefs guide how I am living - Historical to Current: 7.5    Psychotherapy Interventions Provided to Improve Symptoms, Functioning, and Mental Health and Wellbeing      Focused again on missing her mother and was grieving the loss of her body functioning that was a result of her mastectomy.  Processed how she had grown to rely on her mother to help her in life and how she is trying to establish more self-reliance.  Supported and validated Yanelis discussing these issues.      This session also focused on a  strengths-based, whole-person mental health and wellbeing-focused model of psychotherapy for Christel. This approach incorporates empirically validated practice elements derived from cognitive behavioral therapy, acceptance and commitment therapy, positive psychology, mindfulness, and habit formation science. Specific psychotherapeutic modules within the model are personalized to help Yanelis work on and make progress with treatment goals. Culturally and trauma informed care principles are incorporated to contextualize therapeutic strategies to best fit the life experiences and views of Yanelis as indicated.     Based on above ratings and patient preference, Yanelis was guided to work on personalized psychotherapeutic modules within identified Health Practices that are italicized below to improve daily functioning and overall mental health and wellbeing. Instruction, training, guidance, along with traditional therapeutic processing, is used to promote growth in these areas for Yanelis.      Soothe Health Practice: Being Calm and Managing Your Emotions   Calm Your Body and Mind Module: Calming your body and mind whenever you are stressed (cognitive behavioral therapy and acceptance commitment therapy)   Practice Meditation Module: Being calmer by practicing meditation for a few moments each day (mindfulness, cognitive behavioral therapy, and positive psychology)  Embrace Discomfort Module: Using skills to cope with distress (cognitive behavioral therapy and acceptance commitment therapy)   Notes: Reviewed and reinforced Yanelis to utilize breathing meditation      Activate Health Practice: Being Responsible and Successful at Home, Work, and/or School  Conquer Avoidance Module: Avoiding less and actively doing more of what matters each day (behavioral activation and cognitive behavioral therapy)   Accomplish More Module: Doing what needs to be done each day (executive functioning skills, behavioral activation,  and habit formation)  Solve Problems Module: Overcome everyday problems to be more successful in what matters to you (executive functioning skills and cognitive behavioral therapy)  Notes: Reviewed and reinforced Yanelis for working on being more activated including maintaining a regular sleep schedule; being less sedentary and more active and eating lunch between the hour of 1 to 2 PM; she pledged to continue working on these things    MENTAL STATUS EXAM     Yanelis was observed for the following indicators of mental status.     Appearance/Behavior: Adequate grooming, appropriate attire, good eye contact, calm, cooperative, no abnormal movements   Speech: Rate, volume and tone appropriate; no push or pressure; no rapid speech     Mood/Affect: Calm, affect congruent to situation   Thought Process/Content: Coherent, linear; no reports or evidence of auditory hallucinations; no reports or evidence of visual hallucinations    Thought Associations: Logical, no looseness of associations, no flight of ideas, no tangentially, no circumstantiality   Insight/Judgment: Adequate insight into condition and need for treatment; judgement not impaired   Orientation: Alert and oriented to person place, time, and circumstance   Recent and Past Memory: Good    Attention Span/Concentration: Good     Language: English with estimated vocabulary to be average or above   Fund of Knowledge: Estimated to be average or above fund of knowledge      SAFETY PLAN - NA, no SI or SIB     RECOMMENDATIONS     Based on a medical records review and the results of this encounter, it is recommended that Yanelis participate in the following behavioral health services.      Mental Health Outpatient Psychotherapy: Individual-focused skills and habit training with therapeutic processing to improve mental health and wellbeing, conducted by Dr. Vega   Psychiatric Evaluation and Services: Continue consulting with Dr. Driscoll for psychiatric care  including psychiatric diagnostic clarification, medication management, care coordination, and psychotherapy as needed    Primary and Specialty Care Services: Continue consulting with specialty care providers for cancer and PCP for healthcare, medication management, and care coordination as needed        DISPOSITION AND PLAN      Psychotherapy was conducted with Yanelis to make progress on treatment goals, enhance daily functioning, and improve mental health and wellbeing. Motivational interviewing was used to promote insight and engagement and to personalize psychotherapy interventions. If applicable, a safety plan was formulated and reviewed with Yanelis.      The engagement of Yanelis during the session is assessed by this provider as: High     The progress of Etienne on treatment plan goals and objectives is assessed by this provider as: Moderate        In line with feedback informed therapy, Yanelis was guided to complete ratings of perceived Helpfulness and Hopefulness regarding the current encounter, and the results and are recorded below. The results were collaboratively discussed to make adjustments or improvements in subsequent psychotherapy encounters.      Degree of helpfulness of this visit on a scale ranging from 0 is Not Helpful to 10 is Very Helpful - Historical to Current: Did not review  Degree of hopefulness in improving mental health and wellbeing on a scale ranging from 0 Not Hopeful to 10 Very Hopeful - Historical to Current: Did not review    The plan is for Yanelis to follow up with this provider for outpatient psychotherapy and will seek additional recommended services as indicated to continue work on treatment goals.     If there are any questions regarding this information please contact the [facility] at [phone].    David Vega, PhD, LP   Professor of Psychiatry and Behavioral Sciences  [facility]

## 2024-10-31 ENCOUNTER — VIRTUAL VISIT (OUTPATIENT)
Dept: PSYCHIATRY | Facility: CLINIC | Age: 49
End: 2024-10-31
Attending: PSYCHOLOGIST
Payer: COMMERCIAL

## 2024-10-31 DIAGNOSIS — F41.9 ANXIETY DISORDER, UNSPECIFIED TYPE: ICD-10-CM

## 2024-10-31 DIAGNOSIS — F33.0 MILD EPISODE OF RECURRENT MAJOR DEPRESSIVE DISORDER (H): ICD-10-CM

## 2024-10-31 DIAGNOSIS — F90.0 ATTENTION DEFICIT HYPERACTIVITY DISORDER (ADHD), PREDOMINANTLY INATTENTIVE TYPE: Primary | ICD-10-CM

## 2024-10-31 PROCEDURE — 90837 PSYTX W PT 60 MINUTES: CPT | Mod: 95 | Performed by: PSYCHOLOGIST

## 2024-10-31 ASSESSMENT — PATIENT HEALTH QUESTIONNAIRE - PHQ9
10. IF YOU CHECKED OFF ANY PROBLEMS, HOW DIFFICULT HAVE THESE PROBLEMS MADE IT FOR YOU TO DO YOUR WORK, TAKE CARE OF THINGS AT HOME, OR GET ALONG WITH OTHER PEOPLE: VERY DIFFICULT
SUM OF ALL RESPONSES TO PHQ QUESTIONS 1-9: 11
SUM OF ALL RESPONSES TO PHQ QUESTIONS 1-9: 11

## 2024-10-31 NOTE — PROGRESS NOTES
[facility]  [address], [address]  [place] [address]  [phone]     OUTPATIENT PSYCHOTHERAPY ENCOUNTER      PATIENT     Name: Yanelis Lyons  YOB: 1975     SERVICES PROVIDED    Date of Service: 10/31/2024   Time of Service: 210 to 303 pm (53 Minutes)   Type of Service: 49065 Individual Psychotherapy (53+ min)   Service Provider: David Vega, PhD, LP     TELEMEDICINE ENCOUNTER METHODS     Telemedicine psychotherapy was conducted due to the preference Yanelis during scheduling. The patient's condition was safely assessed and treated via synchronous audio and visual telemedicine encounter. A secure real time interactive audio and visual telecommunication system (videoconference via [employer]) was used for the visit.     Patient Visit Location: Home in [place]      Present For Encounter: Yaenlis     Provider Visit Location: HIPAA compliant location within provider home       ENCOUNTER SYNOPSIS     Yanelis participated in a psychotherapy session today with David Vega, PhD, LP. Background information, history, current condition, and available medical record notes were reviewed, and a brief clinical interview was conducted with Yanelis to update the treatment focus and planning as indicated. There was opportunity for Yanelis to discuss and process recent life happenings. The session included providing Yanelis with an evidence-informed, strengths-based, whole-person mental health and wellbeing-focused model of psychotherapy (see Treatment Plan and Psychotherapy Interventions). Coordination of care was also planned with other healthcare providers working with Yanelis if indicated.     BACKGROUND INFORMATION (From 7/8/2024 Intake; Updated 7/18/2024)      Yanelis lives with her father and brother.  She has a BA degree in physics and a BA in English. 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    Yanelis is currently unemployed and her more recent work history is \"spotty.\"  She has reportedly been fired from many jobs due to ADHD symptoms.  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    Yanelis was diagnosed with breast cancer in 2022.  She was successfully treated with chemotherapy, mastectomy, and radiation.    Yanelis is receiving mental health care.  She receives psychiatric care from Dr. Leora Driscoll.  Her diagnosis is reported to be ADHD and she is currently on Adderall.  In addition, she participates in ADHD support groups.    Currently, Yanelis is seeking ADHD informed psychotherapy with the current provider.  Her goal is to become \"reliable and productive again\" and learn how to deal with her executive functioning challenges.    Psychiatric Symptoms         The following symptoms and concerns are to be targeted in the treatment of Yanelis.      Inattention/Hyperactivity/Impulsivity: Reported she was diagnosed with ADHD at the age of 34; even though she is on Adderall, Yanelis reported current symptoms of inattention and to some degree also hyperactivity/impulsivity; in particular she gets overwhelmed with work/tasks and shuts down; indicated her executive functioning is poor and especially being disorganized, having a hard time staying focused, and getting frustrated a lot   Depression: Reported mild recurrent depressive symptoms including recently sadness, irritability, decreased energy, low motivation, poor concentration, sleep disturbance, negative thoughts    Hypomania/Josephine: None reported    Anxiety: Reported recurrent symptoms of worrying, ruminating, nervousness, heightened physical tension, avoidance     Panic: None reported   Obsessions/Compulsions: None reported   Post-Traumatic Stress: None reported   Sleep " "Problems and Fatigue: Reported her sleep is a \"mess\" since being treated for cancer with side effects coming from the many medications she has been on; noted she uses trazodone intermittently for sleep as needed; had 2 prior sleep studies and was determined to be \"normal\" without apnea or restlessness  Alcohol Abuse: None reported   Drug Abuse: None reported     Psychotic: None reported     Suicidal Thoughts: None reported recently; had suicidal ideation as a teenager    Cutting/Self-Injury: None reported         Functional Concerns      The following functional concerns are to be targeted in the treatment of Yanelis.      Home: Reported struggling with household tasks and upkeep      School: Reported having difficulty being organized and completing homework and academic tasks in high school and college      Work: Reported is distractible, off task, forgets things, etc. on the job and she has been fired from several jobs as a result    Financial: Reported prior history of significant credit card debt due to overspending        History     The following reflect pertinent historical findings identified for Yanelis.      Developmental Delays: None reported    Family Problems: Reported that her mother was impatient with her growing up and often yelled at her; indicated that her mother passed away in 2021 from a lung disease; observed that her mother was \"the center of the family\" and the family is struggling without her    Child Abuse/Neglect/Trauma: None reported    Educational Problems: None reported    Mental Health Problems: Reported had undiagnosed depression during her teenage years and that she was officially diagnosed while she attended college; noted she has been struggling with recurrent depressive symptoms throughout adulthood    Physical Health Problems: Reported prior history of breast cancer that was successfully treated and is currently in remission; noted recently being diagnosed with postural " orthostatic tachycardia syndrome (POTS)  Family History of Mental Health Problems: Reported suspicion that her father has ADHD but this has not been officially diagnosed; believes her brother is depressed     Other Problems: None reported      Strengths and Protective Factors      The following reflect strengths and protective factors that could be leveraged in treatment of Yanelis.      Personal: Reported she is persistent and that she is good with knitting, sewing, cross stitching, and painting     Support System: Reported to be her father and brother      CHECK-IN FOR THIS ENCOUNTER     Recent Happenings     Checked in with Yanelis to review how it has been going recently     Noteworthy Events: Reported routine life events has been having fun celebrating Halloween with friends  Noteworthy Stressors/Problems: Reported no particular stressors or problems lately             Recent Health Concerns     Checked in with Yanelis to review current health concerns and rated them ranging from 0 = Currently Low Concern to 10 = Currently Very High Concern.      Emotional Health Concerns: Current level of concern about overall distress, anxiety, depression, or stress - Historical to Current: Moderate; moderate; moderate; moderate to high; moderate; moderate; moderate to high; moderate; moderate; low to moderate; low to moderate  Notes: Reported stable mood with some depression           Patient Health Questionnaire 9 (Submitted on 10/31/2024)  Total Score: 11 (moderate depression)   Physical Health Concerns: Current level of concern about overall physical health, physical activity, body weight, health habits, or pain - Historical to Current: Moderate; moderate; moderate; moderate; moderate; low to moderate; moderate; low; low; low; low  Notes: Reported again feeling generally healthy       Living Health Concerns: Current level of concern about functioning at home, at school and/or work, and in important relationships -  Historical to Current: NA  Notes: Did not review    Recent Safety Concerns    Self: None reported   Others: None reported       TREATMENT PLAN AND PSYCHOTHERAPY UPDATE FOR THIS ENCOUNTER     Diagnoses Being Treated      Yanelis qualifies for diagnoses of ADHD-I; major depression, recurrent, mild; anxiety disorder, unspecified     Treatment Symptoms Goals Addressed and Progress      Based on the Background Information above, the following personalized psychiatric symptom domains for Yanelis are targeted for improvement through psychotherapy. The goal is to achieve an 8 or higher over the course of treatment. Progress to date ranges from 0 Limited to 5 Some to 10 Significant. This progress is episodically reviewed with Yanelis to make treatment decisions.     Improve Understanding of Condition/Illness: Baseline estimated to be 6; 7; 8  Reduce Inattention: Baseline estimated to be 5; 6; 6  Reduce Depression: Baseline estimated to be 5; 6; 6  Reduce Anxiety: Baseline estimated to be 6; 6; 6  Reduce Sleep Problems and Fatigue: Baseline estimated to be 5; 6; 7    Functional Goals Addressed and Progress      Based on the Background Information above, the following personalized functional domains for Yanelis are targeted for improvement through psychotherapy. The goal is to achieve an 8 or higher over the course of treatment. Progress to date ranges from 0 Limited to 5 Some to 10 Significant. This progress is episodically reviewed with Yanelis to make treatment decisions.     Home: Baseline estimated to be 5; 6; 7  School: NA currently  Work: NA currently  Financial: NA currently    Mental Health and Wellbeing Health Practices Addressed and Progress     Using a brief psychotherapy collaborative decision aid, Yanelis was guided to make self-ratings for perceived level of mastery in 12 Health Practices that are aligned with Four Worlds of Mental Health and Wellbeing. Ratings range from 0 = Not Good at This to 10 =  Great at This. Ratings below were recorded on 7/8/2024.      Internal World: Regulation of Emotions, Thoughts, and Stress   Soothe Health Practice: I am calm and manage my emotions - Historical to Current: 5.5  Think Health Practice: I have positive, hopeful and helpful thoughts - Historical to Current: 5 (indicated being self-critical)  Rest Health Practice: I am well rested and energized - Historical to Current: 4    Physical World: Physical Health      Hydrate Health Practice: I drink enough water every day and stay hydrated - Historical to Current: 6  Nourish Health Practice: I eat healthy - Historical to Current: 4 (indicated does not eat breakfast)  Move Health Practice: I am physically active - Historical to Current: 2    External World: Social Relationships and Productive Behaviors   Express Health Practice: I listen to others and express myself well  - Historical to Current: 8  Connect Health Practice: I have positive, healthy and meaningful relationships - Historical to Current: 7  Activate Health Practice: I am responsible and successful at home, work, school - Historical to Current: 3      Spiritual World: Contemplation, Purpose and Meaning, and Living According to Beliefs   Observe Health Practice: I am thankful, accepting, and live in the moment - Historical to Current: 7.5  Seek Health Practice: I do things that give me meaning and purpose - Historical to Current: 7.5  Believe Health Practice: My values and beliefs guide how I am living - Historical to Current: 7.5    Psychotherapy Interventions Provided to Improve Symptoms, Functioning, and Mental Health and Wellbeing      Focused part of the session on reviewing social struggles and social anxiety challenges for Yanelis.  Discussed past and current social challenges developing and maintaining friendships.  Supported and validated Yanelis discussing these issues.      This session also focused on a strengths-based, whole-person mental health and  wellbeing-focused model of psychotherapy for Christel. This approach incorporates empirically validated practice elements derived from cognitive behavioral therapy, acceptance and commitment therapy, positive psychology, mindfulness, and habit formation science. Specific psychotherapeutic modules within the model are personalized to help Yanelis work on and make progress with treatment goals. Culturally and trauma informed care principles are incorporated to contextualize therapeutic strategies to best fit the life experiences and views of Yanelis as indicated.     Based on above ratings and patient preference, Yanelis was guided to work on personalized psychotherapeutic modules within identified Health Practices that are italicized below to improve daily functioning and overall mental health and wellbeing. Instruction, training, guidance, along with traditional therapeutic processing, is used to promote growth in these areas for Yanelis.      Soothe Health Practice: Being Calm and Managing Your Emotions   Calm Your Body and Mind Module: Calming your body and mind whenever you are stressed (cognitive behavioral therapy and acceptance commitment therapy)   Practice Meditation Module: Being calmer by practicing meditation for a few moments each day (mindfulness, cognitive behavioral therapy, and positive psychology)  Embrace Discomfort Module: Using skills to cope with distress (cognitive behavioral therapy and acceptance commitment therapy)   Notes: Reviewed and reinforced Yanelis again to utilize breathing meditation      Activate Health Practice: Being Responsible and Successful at Home, Work, and/or School  Conquer Avoidance Module: Avoiding less and actively doing more of what matters each day (behavioral activation and cognitive behavioral therapy)   Accomplish More Module: Doing what needs to be done each day (executive functioning skills, behavioral activation, and habit formation)  Solve Problems  Module: Overcome everyday problems to be more successful in what matters to you (executive functioning skills and cognitive behavioral therapy)  Notes: Reviewed and reinforced Yanelis again for working on being more activated including maintaining a regular sleep schedule; being more active and eating lunch between the hour of 1 to 2 PM; she pledged to work on phone calls and scheduling appointments in the upcoming week; noted her overall goal is to be more self-reliant by doing these things     MENTAL STATUS EXAM     Yanelis was observed for the following indicators of mental status.     Appearance/Behavior: Adequate grooming, appropriate attire, good eye contact, calm, cooperative, no abnormal movements   Speech: Rate, volume and tone appropriate; no push or pressure; no rapid speech     Mood/Affect: Calm, affect congruent to situation   Thought Process/Content: Coherent, linear; no reports or evidence of auditory hallucinations; no reports or evidence of visual hallucinations    Thought Associations: Logical, no looseness of associations, no flight of ideas, no tangentially, no circumstantiality   Insight/Judgment: Adequate insight into condition and need for treatment; judgement not impaired   Orientation: Alert and oriented to person place, time, and circumstance   Recent and Past Memory: Good    Attention Span/Concentration: Good     Language: English with estimated vocabulary to be average or above   Fund of Knowledge: Estimated to be average or above fund of knowledge      SAFETY PLAN - NA, no SI or SIB     RECOMMENDATIONS     Based on a medical records review and the results of this encounter, it is recommended that Yanelis participate in the following behavioral health services.      Mental Health Outpatient Psychotherapy: Individual-focused skills and habit training with therapeutic processing to improve mental health and wellbeing, conducted by Dr. Vega   Psychiatric Evaluation and Services:  Continue consulting with Dr. Driscoll for psychiatric care including psychiatric diagnostic clarification, medication management, care coordination, and psychotherapy as needed    Primary and Specialty Care Services: Continue consulting with specialty care providers for cancer and PCP for healthcare, medication management, and care coordination as needed        DISPOSITION AND PLAN      Psychotherapy was conducted with Yanelis to make progress on treatment goals, enhance daily functioning, and improve mental health and wellbeing. Motivational interviewing was used to promote insight and engagement and to personalize psychotherapy interventions. If applicable, a safety plan was formulated and reviewed with Yanelis.      The engagement of Yanelis during the session is assessed by this provider as: High     The progress of Etienne on treatment plan goals and objectives is assessed by this provider as: Moderate        In line with feedback informed therapy, Yanelis was guided to complete ratings of perceived Helpfulness and Hopefulness regarding the current encounter, and the results and are recorded below. The results were collaboratively discussed to make adjustments or improvements in subsequent psychotherapy encounters.      Degree of helpfulness of this visit on a scale ranging from 0 is Not Helpful to 10 is Very Helpful - Historical to Current: Did not review  Degree of hopefulness in improving mental health and wellbeing on a scale ranging from 0 Not Hopeful to 10 Very Hopeful - Historical to Current: Did not review    The plan is for Yanelis to follow up with this provider for outpatient psychotherapy and will seek additional recommended services as indicated to continue work on treatment goals.     If there are any questions regarding this information please contact the [facility] at [phone].    David Vega, PhD, LP   Professor of Psychiatry and Behavioral  Sciences  [facility]

## 2024-12-05 ENCOUNTER — VIRTUAL VISIT (OUTPATIENT)
Dept: PSYCHIATRY | Facility: CLINIC | Age: 49
End: 2024-12-05
Attending: PSYCHOLOGIST
Payer: COMMERCIAL

## 2024-12-05 DIAGNOSIS — F33.0 MILD EPISODE OF RECURRENT MAJOR DEPRESSIVE DISORDER (H): ICD-10-CM

## 2024-12-05 DIAGNOSIS — F90.0 ATTENTION DEFICIT HYPERACTIVITY DISORDER (ADHD), PREDOMINANTLY INATTENTIVE TYPE: Primary | ICD-10-CM

## 2024-12-05 DIAGNOSIS — F41.9 ANXIETY DISORDER, UNSPECIFIED TYPE: ICD-10-CM

## 2024-12-05 PROCEDURE — 90837 PSYTX W PT 60 MINUTES: CPT | Mod: 95 | Performed by: PSYCHOLOGIST

## 2024-12-05 NOTE — PROGRESS NOTES
[facility]  [address], [address]  [place] [address]  [phone]     OUTPATIENT PSYCHOTHERAPY ENCOUNTER      PATIENT     Name: Yanelis Lyons  YOB: 1975     SERVICES PROVIDED    Date of Service: 12/5/2024   Time of Service: 204 to 259 pm (55 Minutes)   Type of Service: 11589 Individual Psychotherapy (53+ min)   Service Provider: David Vega, PhD, LP     TELEMEDICINE ENCOUNTER METHODS     Telemedicine psychotherapy was conducted due to the preference Yanelis during scheduling. The patient's condition was safely assessed and treated via synchronous audio and visual telemedicine encounter. A secure real time interactive audio and visual telecommunication system (videoconference via [employer]) was used for the visit.     Patient Visit Location: Home in [place]      Present For Encounter: Yanelis     Provider Visit Location: HIPAA compliant location within provider home       ENCOUNTER SYNOPSIS     Yanelis participated in a psychotherapy session today with David Vega, PhD, LP. Background information, history, current condition, and available medical record notes were reviewed, and a brief clinical interview was conducted with Yanelis to update the treatment focus and planning as indicated. There was opportunity for Yanelis to discuss and process recent life happenings. The session included providing Yanelis with an evidence-informed, strengths-based, whole-person mental health and wellbeing-focused model of psychotherapy (see Treatment Plan and Psychotherapy Interventions). Coordination of care was also planned with other healthcare providers working with Yanelis if indicated.     BACKGROUND INFORMATION (From 7/8/2024 Intake; Updated 7/18/2024)      Yanelis lives with her father and brother.  She has a BA degree in physics and a BA in English. 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    Yanelis is currently unemployed and her more recent work history is \"spotty.\"  She has reportedly been fired from many jobs due to ADHD symptoms.  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    Yanelis was diagnosed with breast cancer in 2022.  She was successfully treated with chemotherapy, mastectomy, and radiation.    Yanelis is receiving mental health care.  She receives psychiatric care from Dr. Leora Driscoll.  Her diagnosis is reported to be ADHD and she is currently on Adderall.  In addition, she participates in ADHD support groups.    Currently, Yanelis is seeking ADHD informed psychotherapy with the current provider.  Her goal is to become \"reliable and productive again\" and learn how to deal with her executive functioning challenges.    Psychiatric Symptoms         The following symptoms and concerns are to be targeted in the treatment of Yanelis.      Inattention/Hyperactivity/Impulsivity: Reported she was diagnosed with ADHD at the age of 34; even though she is on Adderall, Yanelis reported current symptoms of inattention and to some degree also hyperactivity/impulsivity; in particular she gets overwhelmed with work/tasks and shuts down; indicated her executive functioning is poor and especially being disorganized, having a hard time staying focused, and getting frustrated a lot   Depression: Reported mild recurrent depressive symptoms including recently sadness, irritability, decreased energy, low motivation, poor concentration, sleep disturbance, negative thoughts    Hypomania/Josephine: None reported    Anxiety: Reported recurrent symptoms of worrying, ruminating, nervousness, heightened physical tension, avoidance     Panic: None reported   Obsessions/Compulsions: None reported   Post-Traumatic Stress: None reported   Sleep " "Problems and Fatigue: Reported her sleep is a \"mess\" since being treated for cancer with side effects coming from the many medications she has been on; noted she uses trazodone intermittently for sleep as needed; had 2 prior sleep studies and was determined to be \"normal\" without apnea or restlessness  Alcohol Abuse: None reported   Drug Abuse: None reported     Psychotic: None reported     Suicidal Thoughts: None reported recently; had suicidal ideation as a teenager    Cutting/Self-Injury: None reported         Functional Concerns      The following functional concerns are to be targeted in the treatment of Yanelis.      Home: Reported struggling with household tasks and upkeep      School: Reported having difficulty being organized and completing homework and academic tasks in high school and college      Work: Reported is distractible, off task, forgets things, etc. on the job and she has been fired from several jobs as a result    Financial: Reported prior history of significant credit card debt due to overspending        History     The following reflect pertinent historical findings identified for Yanelis.      Developmental Delays: None reported    Family Problems: Reported that her mother was impatient with her growing up and often yelled at her; indicated that her mother passed away in 2021 from a lung disease; observed that her mother was \"the center of the family\" and the family is struggling without her    Child Abuse/Neglect/Trauma: None reported    Educational Problems: None reported    Mental Health Problems: Reported had undiagnosed depression during her teenage years and that she was officially diagnosed while she attended college; noted she has been struggling with recurrent depressive symptoms throughout adulthood    Physical Health Problems: Reported prior history of breast cancer that was successfully treated and is currently in remission; noted recently being diagnosed with postural " orthostatic tachycardia syndrome (POTS)  Family History of Mental Health Problems: Reported suspicion that her father has ADHD but this has not been officially diagnosed; believes her brother is depressed     Other Problems: None reported      Strengths and Protective Factors      The following reflect strengths and protective factors that could be leveraged in treatment of Yanelis.      Personal: Reported she is persistent and that she is good with knitting, sewing, cross stitching, and painting     Support System: Reported to be her father and brother      CHECK-IN FOR THIS ENCOUNTER     Recent Ratings (Submitted on 12/5/2024)    PHQ9 Total Score: 13 (moderate depression)     Recent Happenings     Checked in with Yanelis to review how it has been going recently     Noteworthy Events: Reported routine life events lately   Noteworthy Stressors/Problems: None reported           Recent Health Concerns     Checked in with Yanelis to review current health concerns and rated them ranging from 0 = Currently Low Concern to 10 = Currently Very High Concern.      Emotional Health Concerns: Current level of concern about overall distress, anxiety, depression, or stress - Historical to Current: Moderate; moderate; moderate; moderate to high; moderate; moderate; moderate to high; moderate; moderate; low to moderate; low to moderate; moderate to high; moderate to high; moderate  Notes: Reported continuing low mood, fatigue, and poor motivation         Physical Health Concerns: Current level of concern about overall physical health, physical activity, body weight, health habits, or pain - Historical to Current: Moderate; moderate; moderate; moderate; moderate; low to moderate; moderate; low; low; low; low; low; low; low to moderate  Notes: Reported again feeling generally healthy but not sleeping well      Living Health Concerns: Current level of concern about functioning at home, at school and/or work, and in important  relationships - Historical to Current: NA  Notes: Did not review    Recent Safety Concerns    Self: None reported   Others: None reported       TREATMENT PLAN AND PSYCHOTHERAPY UPDATE FOR THIS ENCOUNTER     Diagnoses Being Treated      Yanelis qualifies for diagnoses of ADHD-I; major depression, recurrent, mild; anxiety disorder, unspecified     Treatment Symptoms Goals Addressed and Progress      Based on the Background Information above, the following personalized psychiatric symptom domains for Yanelis are targeted for improvement through psychotherapy. The goal is to achieve an 8 or higher over the course of treatment. Progress to date ranges from 0 Limited to 5 Some to 10 Significant. This progress is episodically reviewed with Yanelis to make treatment decisions.     Improve Understanding of Condition/Illness: Baseline estimated to be 6; 7; 8; 8  Reduce Inattention: Baseline estimated to be 5; 6; 6; 7  Reduce Depression: Baseline estimated to be 5; 6; 6; 6  Reduce Anxiety: Baseline estimated to be 6; 6; 6; 5  Reduce Sleep Problems and Fatigue: Baseline estimated to be 5; 6; 7; 7    Functional Goals Addressed and Progress      Based on the Background Information above, the following personalized functional domains for Yanelis are targeted for improvement through psychotherapy. The goal is to achieve an 8 or higher over the course of treatment. Progress to date ranges from 0 Limited to 5 Some to 10 Significant. This progress is episodically reviewed with Yanelis to make treatment decisions.     Home: Baseline estimated to be 5; 6; 7; 7  School: NA currently  Work: NA currently  Financial: NA currently    Mental Health and Wellbeing Health Practices Addressed and Progress     Using a brief psychotherapy collaborative decision aid, Yanelis was guided to make self-ratings for perceived level of mastery in 12 Health Practices that are aligned with Four Worlds of Mental Health and Wellbeing. Ratings range  from 0 = Not Good at This to 10 = Great at This. Ratings below were recorded on 7/8/2024.      Internal World: Regulation of Emotions, Thoughts, and Stress   Soothe Health Practice: I am calm and manage my emotions - Historical to Current: 5.5  Think Health Practice: I have positive, hopeful and helpful thoughts - Historical to Current: 5 (indicated being self-critical)  Rest Health Practice: I am well rested and energized - Historical to Current: 4    Physical World: Physical Health      Hydrate Health Practice: I drink enough water every day and stay hydrated - Historical to Current: 6  Nourish Health Practice: I eat healthy - Historical to Current: 4 (indicated does not eat breakfast)  Move Health Practice: I am physically active - Historical to Current: 2    External World: Social Relationships and Productive Behaviors   Express Health Practice: I listen to others and express myself well  - Historical to Current: 8  Connect Health Practice: I have positive, healthy and meaningful relationships - Historical to Current: 7  Activate Health Practice: I am responsible and successful at home, work, school - Historical to Current: 3      Spiritual World: Contemplation, Purpose and Meaning, and Living According to Beliefs   Observe Health Practice: I am thankful, accepting, and live in the moment - Historical to Current: 7.5  Seek Health Practice: I do things that give me meaning and purpose - Historical to Current: 7.5  Believe Health Practice: My values and beliefs guide how I am living - Historical to Current: 7.5    Psychotherapy Interventions Provided to Improve Symptoms, Functioning, and Mental Health and Wellbeing      Discussed ongoing sleep difficulties and resulting fatigue during the day.  Yanelis noted difficulties with her sleep schedule (sleeping a lot during the day and being awake a lot at night recently).  Supported and validated Yanelis regarding her struggle with sleep.                This session  also focused on a strengths-based, whole-person mental health and wellbeing-focused model of psychotherapy for Christel. This approach incorporates empirically validated practice elements derived from cognitive behavioral therapy, acceptance and commitment therapy, positive psychology, mindfulness, and habit formation science. Specific psychotherapeutic modules within the model are personalized to help Yanelis work on and make progress with treatment goals. Culturally and trauma informed care principles are incorporated to contextualize therapeutic strategies to best fit the life experiences and views of Yanelis as indicated.     Based on above ratings and patient preference, Yanelis was guided to work on personalized psychotherapeutic modules within identified Health Practices that are italicized below to improve daily functioning and overall mental health and wellbeing. Instruction, training, guidance, along with traditional therapeutic processing, is used to promote growth in these areas for Yanelis.      Rest Health Practice: Renewing Your Energy, Being Well Rested, and Getting Good Sleep  Sleep Well Module: Building the habits and routines needed to sleep well every night (cognitive behavioral therapy and habit formation)    Conquer Insomnia Module: Using proven strategies for overcoming sleeplessness (cognitive behavioral therapy and habit formation)  Generate More Energy Module: Having a routine of doing revitalizing activities (behavioral activation and cognitive behavioral therapy)    Notes: Introduced, reviewed, and planned for how Yanelis can utilize sleep well or sleep hygiene habits; noted her best sleep schedule would be midnight to 8 AM; discussed techniques to improve her overall sleep habits before and after bed    Soothe Health Practice: Being Calm and Managing Your Emotions   Calm Your Body and Mind Module: Calming your body and mind whenever you are stressed (cognitive behavioral therapy  and acceptance commitment therapy)   Practice Meditation Module: Being calmer by practicing meditation for a few moments each day (mindfulness, cognitive behavioral therapy, and positive psychology)  Embrace Discomfort Module: Using skills to cope with distress (cognitive behavioral therapy and acceptance commitment therapy)   Notes: Reviewed and reinforced Yanelis again to utilize calm your body and mind strategy along with breathing meditation      Activate Health Practice: Being Responsible and Successful at Home, Work, and/or School  Conquer Avoidance Module: Avoiding less and actively doing more of what matters each day (behavioral activation and cognitive behavioral therapy)   Accomplish More Module: Doing what needs to be done each day (executive functioning skills, behavioral activation, and habit formation)  Solve Problems Module: Overcome everyday problems to be more successful in what matters to you (executive functioning skills and cognitive behavioral therapy)  Notes: Reviewed and reinforced Yanelis again for working on being more activated including maintaining a regular sleep schedule; being more active and healthy eating schedule      MENTAL STATUS EXAM     Yanelis was observed for the following indicators of mental status.     Appearance/Behavior: Adequate grooming, appropriate attire, good eye contact, calm, cooperative, no abnormal movements   Speech: Rate, volume and tone appropriate; no push or pressure; no rapid speech     Mood/Affect: Calm, affect congruent to situation   Thought Process/Content: Coherent, linear; no reports or evidence of auditory hallucinations; no reports or evidence of visual hallucinations    Thought Associations: Logical, no looseness of associations, no flight of ideas, no tangentially, no circumstantiality   Insight/Judgment: Adequate insight into condition and need for treatment; judgement not impaired   Orientation: Alert and oriented to person place, time, and  circumstance   Recent and Past Memory: Good    Attention Span/Concentration: Good     Language: English with estimated vocabulary to be average or above   Fund of Knowledge: Estimated to be average or above fund of knowledge      SAFETY PLAN - NA, no SI or SIB     RECOMMENDATIONS     Based on a medical records review and the results of this encounter, it is recommended that Yanelis participate in the following behavioral health services.      Mental Health Outpatient Psychotherapy: Individual-focused skills and habit training with therapeutic processing to improve mental health and wellbeing, conducted by Dr. Vega   Psychiatric Evaluation and Services: Continue consulting with Dr. Driscoll for psychiatric care including psychiatric diagnostic clarification, medication management, care coordination, and psychotherapy as needed    Primary and Specialty Care Services: Continue consulting with specialty care providers for cancer and PCP for healthcare, medication management, and care coordination as needed        DISPOSITION AND PLAN      Psychotherapy was conducted with Yanelis to make progress on treatment goals, enhance daily functioning, and improve mental health and wellbeing. Motivational interviewing was used to promote insight and engagement and to personalize psychotherapy interventions. If applicable, a safety plan was formulated and reviewed with Yanelis.      The engagement of Yanelis during the session is assessed by this provider as: High     The progress of Etienne on treatment plan goals and objectives is assessed by this provider as: Moderate        In line with feedback informed therapy, Yanelis was guided to complete ratings of perceived Helpfulness and Hopefulness regarding the current encounter, and the results and are recorded below. The results were collaboratively discussed to make adjustments or improvements in subsequent psychotherapy encounters.      Degree of helpfulness of  this visit on a scale ranging from 0 is Not Helpful to 10 is Very Helpful - Historical to Current: Did not review  Degree of hopefulness in improving mental health and wellbeing on a scale ranging from 0 Not Hopeful to 10 Very Hopeful - Historical to Current: Did not review    The plan is for Yanelis to follow up with this provider for outpatient psychotherapy and will seek additional recommended services as indicated to continue work on treatment goals.     If there are any questions regarding this information please contact the [facility] at [phone].    David Vega, PhD, LP   Professor of Psychiatry and Behavioral Sciences  [facility]

## 2024-12-19 ENCOUNTER — VIRTUAL VISIT (OUTPATIENT)
Dept: PSYCHIATRY | Facility: CLINIC | Age: 49
End: 2024-12-19
Attending: PSYCHOLOGIST
Payer: COMMERCIAL

## 2024-12-19 DIAGNOSIS — F33.0 MILD EPISODE OF RECURRENT MAJOR DEPRESSIVE DISORDER (H): ICD-10-CM

## 2024-12-19 DIAGNOSIS — F41.9 ANXIETY DISORDER, UNSPECIFIED TYPE: ICD-10-CM

## 2024-12-19 DIAGNOSIS — F90.0 ATTENTION DEFICIT HYPERACTIVITY DISORDER (ADHD), PREDOMINANTLY INATTENTIVE TYPE: Primary | ICD-10-CM

## 2024-12-19 PROCEDURE — 90837 PSYTX W PT 60 MINUTES: CPT | Mod: 95 | Performed by: PSYCHOLOGIST

## 2024-12-19 ASSESSMENT — PATIENT HEALTH QUESTIONNAIRE - PHQ9
SUM OF ALL RESPONSES TO PHQ QUESTIONS 1-9: 11
SUM OF ALL RESPONSES TO PHQ QUESTIONS 1-9: 11
10. IF YOU CHECKED OFF ANY PROBLEMS, HOW DIFFICULT HAVE THESE PROBLEMS MADE IT FOR YOU TO DO YOUR WORK, TAKE CARE OF THINGS AT HOME, OR GET ALONG WITH OTHER PEOPLE: VERY DIFFICULT

## 2024-12-19 NOTE — PROGRESS NOTES
Answers submitted by the patient for this visit:  Patient Health Questionnaire (Submitted on 12/19/2024)  If you checked off any problems, how difficult have these problems made it for you to do your work, take care of things at home, or get along with other people?: Very difficult  PHQ9 TOTAL SCORE: 11     "programming.    Yanelis is currently unemployed and her more recent work history is \"spotty.\"  She has reportedly been fired from many jobs due to ADHD symptoms.  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    Yanelis was diagnosed with breast cancer in 2022.  She was successfully treated with chemotherapy, mastectomy, and radiation.    Yanelis is receiving mental health care.  She receives psychiatric care from Dr. Leora Driscoll.  Her diagnosis is reported to be ADHD and she is currently on Adderall.  In addition, she participates in ADHD support groups.    Currently, Yanelis is seeking ADHD informed psychotherapy with the current provider.  Her goal is to become \"reliable and productive again\" and learn how to deal with her executive functioning challenges.    Psychiatric Symptoms         The following symptoms and concerns are to be targeted in the treatment of Yanelis.      Inattention/Hyperactivity/Impulsivity: Reported she was diagnosed with ADHD at the age of 34; even though she is on Adderall, Yanelis reported current symptoms of inattention and to some degree also hyperactivity/impulsivity; in particular she gets overwhelmed with work/tasks and shuts down; indicated her executive functioning is poor and especially being disorganized, having a hard time staying focused, and getting frustrated a lot   Depression: Reported mild recurrent depressive symptoms including recently sadness, irritability, decreased energy, low motivation, poor concentration, sleep disturbance, negative thoughts    Hypomania/Josephine: None reported    Anxiety: Reported recurrent symptoms of worrying, ruminating, nervousness, heightened physical tension, avoidance     Panic: None reported   Obsessions/Compulsions: None reported   Post-Traumatic Stress: None reported   Sleep " "Problems and Fatigue: Reported her sleep is a \"mess\" since being treated for cancer with side effects coming from the many medications she has been on; noted she uses trazodone intermittently for sleep as needed; had 2 prior sleep studies and was determined to be \"normal\" without apnea or restlessness  Alcohol Abuse: None reported   Drug Abuse: None reported     Psychotic: None reported     Suicidal Thoughts: None reported recently; had suicidal ideation as a teenager    Cutting/Self-Injury: None reported         Functional Concerns      The following functional concerns are to be targeted in the treatment of Yanelis.      Home: Reported struggling with household tasks and upkeep      School: Reported having difficulty being organized and completing homework and academic tasks in high school and college      Work: Reported is distractible, off task, forgets things, etc. on the job and she has been fired from several jobs as a result    Financial: Reported prior history of significant credit card debt due to overspending        History     The following reflect pertinent historical findings identified for Yanelis.      Developmental Delays: None reported    Family Problems: Reported that her mother was impatient with her growing up and often yelled at her; indicated that her mother passed away in 2021 from a lung disease; observed that her mother was \"the center of the family\" and the family is struggling without her    Child Abuse/Neglect/Trauma: None reported    Educational Problems: None reported    Mental Health Problems: Reported had undiagnosed depression during her teenage years and that she was officially diagnosed while she attended college; noted she has been struggling with recurrent depressive symptoms throughout adulthood    Physical Health Problems: Reported prior history of breast cancer that was successfully treated and is currently in remission; noted recently being diagnosed with postural " orthostatic tachycardia syndrome (POTS)  Family History of Mental Health Problems: Reported suspicion that her father has ADHD but this has not been officially diagnosed; believes her brother is depressed     Other Problems: None reported      Strengths and Protective Factors      The following reflect strengths and protective factors that could be leveraged in treatment of Yanelis.      Personal: Reported she is persistent and that she is good with knitting, sewing, cross stitching, and painting     Support System: Reported to be her father and brother      CHECK-IN FOR THIS ENCOUNTER     Recent Ratings (Submitted on 12/5/2024)    PHQ9 Total Score: 11 (moderate depression)     Recent Happenings     Checked in with Yanelis to review how it has been going recently     Noteworthy Events: Reported routine life events lately   Noteworthy Stressors/Problems: Reported anticipatory stress preparing for a family [place] gathering noting family dynamics are tense with some extended family members           Recent Health Concerns     Checked in with Yanelis to review current health concerns and rated them ranging from 0 = Currently Low Concern to 10 = Currently Very High Concern.      Emotional Health Concerns: Current level of concern about overall distress, anxiety, depression, or stress - Historical to Current: Moderate; moderate; moderate; moderate to high; moderate; moderate; moderate to high; moderate; moderate; low to moderate; low to moderate; moderate to high; moderate to high; moderate; moderate  Notes: Reported again having low mood, fatigue, and poor motivation         Physical Health Concerns: Current level of concern about overall physical health, physical activity, body weight, health habits, or pain - Historical to Current: Moderate; moderate; moderate; moderate; moderate; low to moderate; moderate; low; low; low; low; low; low; low to moderate; low   Notes: Reported again feeling generally healthy and  is sleeping somewhat better       Living Health Concerns: Current level of concern about functioning at home, at school and/or work, and in important relationships - Historical to Current: NA  Notes: Did not review    Recent Safety Concerns    Self: None reported   Others: None reported       TREATMENT PLAN AND PSYCHOTHERAPY UPDATE FOR THIS ENCOUNTER     Diagnoses Being Treated      Yanelis qualifies for diagnoses of ADHD-I; major depression, recurrent, mild; anxiety disorder, unspecified     Treatment Symptoms Goals Addressed and Progress      Based on the Background Information above, the following personalized psychiatric symptom domains for Yanelis are targeted for improvement through psychotherapy. The goal is to achieve an 8 or higher over the course of treatment. Progress to date ranges from 0 Limited to 5 Some to 10 Significant. This progress is episodically reviewed with Yanelis to make treatment decisions.     Improve Understanding of Condition/Illness: Baseline estimated to be 6; 7; 8; 8; 8  Reduce Inattention: Baseline estimated to be 5; 6; 6; 7; 6  Reduce Depression: Baseline estimated to be 5; 6; 6; 6; 6  Reduce Anxiety: Baseline estimated to be 6; 6; 6; 5; 6  Reduce Sleep Problems and Fatigue: Baseline estimated to be 5; 6; 7; 7; 7    Functional Goals Addressed and Progress      Based on the Background Information above, the following personalized functional domains for Yanelis are targeted for improvement through psychotherapy. The goal is to achieve an 8 or higher over the course of treatment. Progress to date ranges from 0 Limited to 5 Some to 10 Significant. This progress is episodically reviewed with Yanelis to make treatment decisions.     Home: Baseline estimated to be 5; 6; 7; 7; 6  School: NA currently  Work: NA currently  Financial: NA currently    Mental Health and Wellbeing Health Practices Addressed and Progress     Using a brief psychotherapy collaborative decision aid,  Yanelis was guided to make self-ratings for perceived level of mastery in 12 Health Practices that are aligned with Four Worlds of Mental Health and Wellbeing. Ratings range from 0 = Not Good at This to 10 = Great at This. Ratings below were recorded on 7/8/2024.      Internal World: Regulation of Emotions, Thoughts, and Stress   Soothe Health Practice: I am calm and manage my emotions - Historical to Current: 5.5  Think Health Practice: I have positive, hopeful and helpful thoughts - Historical to Current: 5 (indicated being self-critical)  Rest Health Practice: I am well rested and energized - Historical to Current: 4    Physical World: Physical Health      Hydrate Health Practice: I drink enough water every day and stay hydrated - Historical to Current: 6  Nourish Health Practice: I eat healthy - Historical to Current: 4 (indicated does not eat breakfast)  Move Health Practice: I am physically active - Historical to Current: 2    External World: Social Relationships and Productive Behaviors   Express Health Practice: I listen to others and express myself well  - Historical to Current: 8  Connect Health Practice: I have positive, healthy and meaningful relationships - Historical to Current: 7  Activate Health Practice: I am responsible and successful at home, work, school - Historical to Current: 3      Spiritual World: Contemplation, Purpose and Meaning, and Living According to Beliefs   Observe Health Practice: I am thankful, accepting, and live in the moment - Historical to Current: 7.5  Seek Health Practice: I do things that give me meaning and purpose - Historical to Current: 7.5  Believe Health Practice: My values and beliefs guide how I am living - Historical to Current: 7.5    Psychotherapy Interventions Provided to Improve Symptoms, Functioning, and Mental Health and Wellbeing      Initially the focus was on Yanelis's perceptions of family dynamics that we will be more pronounced during the Estelle  "holidays.  Noted that family holidays have changed and are more stressful since her mother .  Indicating having \"no closure\" regarding her relationship with her mother.  Discussed and processed Yanelis's perceptions of her relationship with her mother.  Supported and validated Yanelis for trying to work through her feelings and thoughts about her mother to obtain some closure.    Revisited Yanelis's ongoing sleep difficulties. Yanelis noted some progress sleeping more but has not really yet gotten herself on a consistent sleep schedule.  Continues to take occasional naps during the day which interferes with her sleep schedule.  Problem solved, supported, and validated Yanelis regarding her struggle with sleep.                This session also focused on a strengths-based, whole-person mental health and wellbeing-focused model of psychotherapy for Christel. This approach incorporates empirically validated practice elements derived from cognitive behavioral therapy, acceptance and commitment therapy, positive psychology, mindfulness, and habit formation science. Specific psychotherapeutic modules within the model are personalized to help Yanelis work on and make progress with treatment goals. Culturally and trauma informed care principles are incorporated to contextualize therapeutic strategies to best fit the life experiences and views of Yanelis as indicated.     Based on above ratings and patient preference, Yanelis was guided to work on personalized psychotherapeutic modules within identified Health Practices that are italicized below to improve daily functioning and overall mental health and wellbeing. Instruction, training, guidance, along with traditional therapeutic processing, is used to promote growth in these areas for Yanelis.      Express Health Practice: Listening to Others and Expressing Yourself Well  Express Well Module: Expressing yourself clearly and effectively especially during " meaningful or challenging conversations with others (communication skills, cognitive behavioral therapy, and positive psychology)  Listen Well Module: Actively listening to understand others especially during meaningful or challenging conversations (communication skills, cognitive behavioral therapy, and positive psychology)   Embrace Differences Module: Calmly working out disagreements with others (communication skills, cognitive behavioral therapy, and positive psychology)   Notes: Introduced, reviewed, and planned for how Yanelis can utilize express well communication skills, with a particular focus on being assertive, including being assertive with her brother at home and some friends in her friendship [place]    Rest Health Practice: Renewing Your Energy, Being Well Rested, and Getting Good Sleep  Sleep Well Module: Building the habits and routines needed to sleep well every night (cognitive behavioral therapy and habit formation)    Conquer Insomnia Module: Using proven strategies for overcoming sleeplessness (cognitive behavioral therapy and habit formation)  Generate More Energy Module: Having a routine of doing revitalizing activities (behavioral activation and cognitive behavioral therapy)    Notes: Reviewed and reinforced Yanelis to utilize sleep hygiene habits with a focus on improving her sleep schedule of midnight to 8 AM     Soothe Health Practice: Being Calm and Managing Your Emotions   Calm Your Body and Mind Module: Calming your body and mind whenever you are stressed (cognitive behavioral therapy and acceptance commitment therapy)   Practice Meditation Module: Being calmer by practicing meditation for a few moments each day (mindfulness, cognitive behavioral therapy, and positive psychology)  Embrace Discomfort Module: Using skills to cope with distress (cognitive behavioral therapy and acceptance commitment therapy)   Notes: Reviewed and reinforced Yanelis again to utilize calm your body and  mind strategy along with breathing meditation      Activate Health Practice: Being Responsible and Successful at Home, Work, and/or School  Conquer Avoidance Module: Avoiding less and actively doing more of what matters each day (behavioral activation and cognitive behavioral therapy)   Accomplish More Module: Doing what needs to be done each day (executive functioning skills, behavioral activation, and habit formation)  Solve Problems Module: Overcome everyday problems to be more successful in what matters to you (executive functioning skills and cognitive behavioral therapy)  Notes: Reviewed and reinforced Yanelis again for working on being more activated including maintaining a regular sleep schedule; being more active and healthy eating schedule      MENTAL STATUS EXAM     Yanelis was observed for the following indicators of mental status.     Appearance/Behavior: Adequate grooming, appropriate attire, good eye contact, calm, cooperative, no abnormal movements   Speech: Rate, volume and tone appropriate; no push or pressure; no rapid speech     Mood/Affect: Calm, affect congruent to situation   Thought Process/Content: Coherent, linear; no reports or evidence of auditory hallucinations; no reports or evidence of visual hallucinations    Thought Associations: Logical, no looseness of associations, no flight of ideas, no tangentially, no circumstantiality   Insight/Judgment: Adequate insight into condition and need for treatment; judgement not impaired   Orientation: Alert and oriented to person place, time, and circumstance   Recent and Past Memory: Good    Attention Span/Concentration: Good     Language: English with estimated vocabulary to be average or above   Fund of Knowledge: Estimated to be average or above fund of knowledge      SAFETY PLAN - NA, no SI or SIB     RECOMMENDATIONS     Based on a medical records review and the results of this encounter, it is recommended that Yanelis participate in the  following behavioral health services.      Mental Health Outpatient Psychotherapy: Individual-focused skills and habit training with therapeutic processing to improve mental health and wellbeing, conducted by Dr. Vega   Psychiatric Evaluation and Services: Continue consulting with Dr. Driscoll for psychiatric care including psychiatric diagnostic clarification, medication management, care coordination, and psychotherapy as needed    Primary and Specialty Care Services: Continue consulting with specialty care providers for cancer and PCP for healthcare, medication management, and care coordination as needed        DISPOSITION AND PLAN      Psychotherapy was conducted with Yanelis to make progress on treatment goals, enhance daily functioning, and improve mental health and wellbeing. Motivational interviewing was used to promote insight and engagement and to personalize psychotherapy interventions. If applicable, a safety plan was formulated and reviewed with Yanelis.      The engagement of Yanelis during the session is assessed by this provider as: High     The progress of Etienne on treatment plan goals and objectives is assessed by this provider as: Moderate        In line with feedback informed therapy, Yanelis was guided to complete ratings of perceived Helpfulness and Hopefulness regarding the current encounter, and the results and are recorded below. The results were collaboratively discussed to make adjustments or improvements in subsequent psychotherapy encounters.      Degree of helpfulness of this visit on a scale ranging from 0 is Not Helpful to 10 is Very Helpful - Historical to Current: Did not review  Degree of hopefulness in improving mental health and wellbeing on a scale ranging from 0 Not Hopeful to 10 Very Hopeful - Historical to Current: Did not review    The plan is for Yanelis to follow up with this provider for outpatient psychotherapy and will seek additional recommended  services as indicated to continue work on treatment goals.     If there are any questions regarding this information please contact the [facility] at [phone].    David Vega, PhD, LP   Professor of Psychiatry and Behavioral Sciences  [facility]

## 2025-01-01 ASSESSMENT — PATIENT HEALTH QUESTIONNAIRE - PHQ9
SUM OF ALL RESPONSES TO PHQ QUESTIONS 1-9: 12
SUM OF ALL RESPONSES TO PHQ QUESTIONS 1-9: 12
10. IF YOU CHECKED OFF ANY PROBLEMS, HOW DIFFICULT HAVE THESE PROBLEMS MADE IT FOR YOU TO DO YOUR WORK, TAKE CARE OF THINGS AT HOME, OR GET ALONG WITH OTHER PEOPLE: VERY DIFFICULT

## 2025-01-02 ENCOUNTER — VIRTUAL VISIT (OUTPATIENT)
Dept: PSYCHIATRY | Facility: CLINIC | Age: 50
End: 2025-01-02
Attending: PSYCHOLOGIST
Payer: COMMERCIAL

## 2025-01-02 DIAGNOSIS — F33.0 MILD EPISODE OF RECURRENT MAJOR DEPRESSIVE DISORDER: ICD-10-CM

## 2025-01-02 DIAGNOSIS — F90.0 ATTENTION DEFICIT HYPERACTIVITY DISORDER (ADHD), PREDOMINANTLY INATTENTIVE TYPE: Primary | ICD-10-CM

## 2025-01-02 DIAGNOSIS — F41.9 ANXIETY DISORDER, UNSPECIFIED TYPE: ICD-10-CM

## 2025-01-02 PROCEDURE — 90837 PSYTX W PT 60 MINUTES: CPT | Mod: 95 | Performed by: PSYCHOLOGIST

## 2025-01-02 NOTE — PROGRESS NOTES
[facility]  [address], [address]  [place] [address]  [phone]     OUTPATIENT PSYCHOTHERAPY ENCOUNTER      PATIENT     Name: Yanelis Lyons  YOB: 1975     SERVICES PROVIDED    Date of Service: 1/2/2025   Time of Service: 203 to 258 pm (53 Minutes)   Type of Service: 95984 Individual Psychotherapy (53+ min)   Service Provider: David Vega, PhD, LP     TELEMEDICINE ENCOUNTER METHODS     Telemedicine psychotherapy was conducted due to the preference Yanelis during scheduling. The patient's condition was safely assessed and treated via synchronous audio and visual telemedicine encounter. A secure real time interactive audio and visual telecommunication system (videoconference via [employer]) was used for the visit.     Patient Visit Location: Home in [place]      Present For Encounter: Yanelis     Provider Visit Location: HIPAA compliant location within provider home       ENCOUNTER SYNOPSIS     Yanelis participated in a psychotherapy session today with David Vega, PhD, LP. Background information, history, current condition, and available medical record notes were reviewed, and a brief clinical interview was conducted with Yanelis to update the treatment focus and planning as indicated. There was opportunity for Yanelis to discuss and process recent life happenings. The session included providing Yanelis with an evidence-informed, strengths-based, whole-person mental health and wellbeing-focused model of psychotherapy (see Treatment Plan and Psychotherapy Interventions). Coordination of care was also planned with other healthcare providers working with Yanelis if indicated.     BACKGROUND INFORMATION (From 7/8/2024 Intake; Updated 7/18/2024)      Yanelis lives with her father and brother.  She has a BA degree in physics and a BA in English. 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    Yanelis is currently unemployed and her more recent work history is \"spotty.\"  She has reportedly been fired from many jobs due to ADHD symptoms.  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    Yanelis was diagnosed with breast cancer in 2022.  She was successfully treated with chemotherapy, mastectomy, and radiation.    Yanelis is receiving mental health care.  She receives psychiatric care from Dr. Leora Driscoll.  Her diagnosis is reported to be ADHD and she is currently on Adderall.  In addition, she participates in ADHD support groups.    Currently, Yanelis is seeking ADHD informed psychotherapy with the current provider.  Her goal is to become \"reliable and productive again\" and learn how to deal with her executive functioning challenges.    Psychiatric Symptoms         The following symptoms and concerns are to be targeted in the treatment of Yanelis.      Inattention/Hyperactivity/Impulsivity: Reported she was diagnosed with ADHD at the age of 34; even though she is on Adderall, Yanelis reported current symptoms of inattention and to some degree also hyperactivity/impulsivity; in particular she gets overwhelmed with work/tasks and shuts down; indicated her executive functioning is poor and especially being disorganized, having a hard time staying focused, and getting frustrated a lot   Depression: Reported mild recurrent depressive symptoms including recently sadness, irritability, decreased energy, low motivation, poor concentration, sleep disturbance, negative thoughts    Hypomania/Josephine: None reported    Anxiety: Reported recurrent symptoms of worrying, ruminating, nervousness, heightened physical tension, avoidance     Panic: None reported   Obsessions/Compulsions: None reported   Post-Traumatic Stress: None reported   Sleep " "Problems and Fatigue: Reported her sleep is a \"mess\" since being treated for cancer with side effects coming from the many medications she has been on; noted she uses trazodone intermittently for sleep as needed; had 2 prior sleep studies and was determined to be \"normal\" without apnea or restlessness  Alcohol Abuse: None reported   Drug Abuse: None reported     Psychotic: None reported     Suicidal Thoughts: None reported recently; had suicidal ideation as a teenager    Cutting/Self-Injury: None reported         Functional Concerns      The following functional concerns are to be targeted in the treatment of Yanelis.      Home: Reported struggling with household tasks and upkeep      School: Reported having difficulty being organized and completing homework and academic tasks in high school and college      Work: Reported is distractible, off task, forgets things, etc. on the job and she has been fired from several jobs as a result    Financial: Reported prior history of significant credit card debt due to overspending        History     The following reflect pertinent historical findings identified for Yanelis.      Developmental Delays: None reported    Family Problems: Reported that her mother was impatient with her growing up and often yelled at her; indicated that her mother passed away in 2021 from a lung disease; observed that her mother was \"the center of the family\" and the family is struggling without her    Child Abuse/Neglect/Trauma: None reported    Educational Problems: None reported    Mental Health Problems: Reported had undiagnosed depression during her teenage years and that she was officially diagnosed while she attended college; noted she has been struggling with recurrent depressive symptoms throughout adulthood    Physical Health Problems: Reported prior history of breast cancer that was successfully treated and is currently in remission; noted recently being diagnosed with postural " "orthostatic tachycardia syndrome (POTS)  Family History of Mental Health Problems: Reported suspicion that her father has ADHD but this has not been officially diagnosed; believes her brother is depressed     Other Problems: None reported      Strengths and Protective Factors      The following reflect strengths and protective factors that could be leveraged in treatment of Yanelis.      Personal: Reported she is persistent and that she is good with knitting, sewing, cross stitching, and painting     Support System: Reported to be her father and brother      CHECK-IN FOR THIS ENCOUNTER     Recent Ratings (Submitted on 1/1/2025)    PHQ9 Total Score: 12 (moderate depression)     Recent Happenings     Checked in with Yanelis to review how it has been going recently     Noteworthy Events: Reported routine life events lately   Noteworthy Stressors/Problems: Reported stress of having a lumbar procedure recently; reported it was scary, but it went okay          Recent Health Concerns     Checked in with Yanelis to review current health concerns and rated them ranging from 0 = Currently Low Concern to 10 = Currently Very High Concern.      Emotional Health Concerns: Current level of concern about overall distress, anxiety, depression, or stress - Historical to Current: Moderate; moderate; moderate; moderate to high; moderate; moderate; moderate to high; moderate; moderate; low to moderate; low to moderate; moderate to high; moderate to high; moderate; moderate; moderate to high  Notes: Reported depression has increased, feeling sluggish, low energy, and that \"everything is harder\" lately          Physical Health Concerns: Current level of concern about overall physical health, physical activity, body weight, health habits, or pain - Historical to Current: Moderate; moderate; moderate; moderate; moderate; low to moderate; moderate; low; low; low; low; low; low; low to moderate; low; low to moderate  Notes: Reported " again feeling generally healthy but has been sleeping less well lately        Living Health Concerns: Current level of concern about functioning at home, at school and/or work, and in important relationships - Historical to Current: NA  Notes: Did not review    Recent Safety Concerns    Self: None reported   Others: None reported       TREATMENT PLAN AND PSYCHOTHERAPY UPDATE FOR THIS ENCOUNTER     Diagnoses Being Treated      Yanelis qualifies for diagnoses of ADHD-I; major depression, recurrent, mild; anxiety disorder, unspecified     Treatment Symptoms Goals Addressed and Progress      Based on the Background Information above, the following personalized psychiatric symptom domains for Yanelis are targeted for improvement through psychotherapy. The goal is to achieve an 8 or higher over the course of treatment. Progress to date ranges from 0 Limited to 5 Some to 10 Significant. This progress is episodically reviewed with Yanelis to make treatment decisions.     Improve Understanding of Condition/Illness: Baseline estimated to be 6; 7; 8; 8; 8; 8  Reduce Inattention: Baseline estimated to be 5; 6; 6; 7; 6; 6  Reduce Depression: Baseline estimated to be 5; 6; 6; 6; 6; 5  Reduce Anxiety: Baseline estimated to be 6; 6; 6; 5; 6; 5  Reduce Sleep Problems and Fatigue: Baseline estimated to be 5; 6; 7; 7; 7; 6    Functional Goals Addressed and Progress      Based on the Background Information above, the following personalized functional domains for Yanelis are targeted for improvement through psychotherapy. The goal is to achieve an 8 or higher over the course of treatment. Progress to date ranges from 0 Limited to 5 Some to 10 Significant. This progress is episodically reviewed with Yanelis to make treatment decisions.     Home: Baseline estimated to be 5; 6; 7; 7; 6; 5  School: NA currently  Work: NA currently  Financial: NA currently    Mental Health and Wellbeing Health Practices Addressed and Progress      Using a brief psychotherapy collaborative decision aid, Yanelis was guided to make self-ratings for perceived level of mastery in 12 Health Practices that are aligned with Four Worlds of Mental Health and Wellbeing. Ratings range from 0 = Not Good at This to 10 = Great at This. Ratings below were recorded on 7/8/2024.      Internal World: Regulation of Emotions, Thoughts, and Stress   Soothe Health Practice: I am calm and manage my emotions - Historical to Current: 5.5  Think Health Practice: I have positive, hopeful and helpful thoughts - Historical to Current: 5 (indicated being self-critical)  Rest Health Practice: I am well rested and energized - Historical to Current: 4    Physical World: Physical Health      Hydrate Health Practice: I drink enough water every day and stay hydrated - Historical to Current: 6  Nourish Health Practice: I eat healthy - Historical to Current: 4 (indicated does not eat breakfast)  Move Health Practice: I am physically active - Historical to Current: 2    External World: Social Relationships and Productive Behaviors   Express Health Practice: I listen to others and express myself well  - Historical to Current: 8  Connect Health Practice: I have positive, healthy and meaningful relationships - Historical to Current: 7  Activate Health Practice: I am responsible and successful at home, work, school - Historical to Current: 3      Spiritual World: Contemplation, Purpose and Meaning, and Living According to Beliefs   Observe Health Practice: I am thankful, accepting, and live in the moment - Historical to Current: 7.5  Seek Health Practice: I do things that give me meaning and purpose - Historical to Current: 7.5  Believe Health Practice: My values and beliefs guide how I am living - Historical to Current: 7.5    Psychotherapy Interventions Provided to Improve Symptoms, Functioning, and Mental Health and Wellbeing      Initially the focus was on Yanelis's trials and tribulations  related to a variety of different health problems.  Talked about and processed her experiences, thoughts, and feelings related to cancer treatment, neurological problems, and POTS.  Additionally, psychosocial problems related to the pandemic and her mother's death exacerbated her health problems.  Supported and validated Yanelis for her ongoing medical difficulties.  Encouraged her to follow-up with psychiatry and specialty care providers.  She noted having appointments set up in this regard in the near future.            This session also focused on a strengths-based, whole-person mental health and wellbeing-focused model of psychotherapy for Christel. This approach incorporates empirically validated practice elements derived from cognitive behavioral therapy, acceptance and commitment therapy, positive psychology, mindfulness, and habit formation science. Specific psychotherapeutic modules within the model are personalized to help Yanelis work on and make progress with treatment goals. Culturally and trauma informed care principles are incorporated to contextualize therapeutic strategies to best fit the life experiences and views of Yanelis as indicated.     Based on above ratings and patient preference, Yanelis was guided to work on personalized psychotherapeutic modules within identified Health Practices that are italicized below to improve daily functioning and overall mental health and wellbeing. Instruction, training, guidance, along with traditional therapeutic processing, is used to promote growth in these areas for Yanelis.      Express Health Practice: Listening to Others and Expressing Yourself Well  Express Well Module: Expressing yourself clearly and effectively especially during meaningful or challenging conversations with others (communication skills, cognitive behavioral therapy, and positive psychology)  Listen Well Module: Actively listening to understand others especially during meaningful  or challenging conversations (communication skills, cognitive behavioral therapy, and positive psychology)   Embrace Differences Module: Calmly working out disagreements with others (communication skills, cognitive behavioral therapy, and positive psychology)   Notes: Introduced and reinforced Yanelis to utilize express well communication skills, with a particular focus on being assertive     Rest Health Practice: Renewing Your Energy, Being Well Rested, and Getting Good Sleep  Sleep Well Module: Building the habits and routines needed to sleep well every night (cognitive behavioral therapy and habit formation)    Conquer Insomnia Module: Using proven strategies for overcoming sleeplessness (cognitive behavioral therapy and habit formation)  Generate More Energy Module: Having a routine of doing revitalizing activities (behavioral activation and cognitive behavioral therapy)    Notes: Reviewed and reinforced Yanelis to utilize sleep hygiene habits with a focus on improving her sleep schedule of midnight to 8 AM; noted not making slight progress, and is mindful that she naps too much during the day, which throws off her schedule, so she is going to try to work on that    Soothe Health Practice: Being Calm and Managing Your Emotions   Calm Your Body and Mind Module: Calming your body and mind whenever you are stressed (cognitive behavioral therapy and acceptance commitment therapy)   Practice Meditation Module: Being calmer by practicing meditation for a few moments each day (mindfulness, cognitive behavioral therapy, and positive psychology)  Embrace Discomfort Module: Using skills to cope with distress (cognitive behavioral therapy and acceptance commitment therapy)   Notes: Reviewed and reinforced Yanelis again to utilize calm your body and mind strategy along with breathing meditation      Activate Health Practice: Being Responsible and Successful at Home, Work, and/or School  Conquer Avoidance Module:  Avoiding less and actively doing more of what matters each day (behavioral activation and cognitive behavioral therapy)   Accomplish More Module: Doing what needs to be done each day (executive functioning skills, behavioral activation, and habit formation)  Solve Problems Module: Overcome everyday problems to be more successful in what matters to you (executive functioning skills and cognitive behavioral therapy)  Notes: Reviewed and reinforced Yanelis again for working on being more activated including maintaining a regular sleep schedule; being more active and healthy eating schedule; she intends to get back on track related to gentle exercise for her POTS    MENTAL STATUS EXAM     Yanelis was observed for the following indicators of mental status.     Appearance/Behavior: Adequate grooming, appropriate attire, good eye contact, calm, cooperative, no abnormal movements   Speech: Rate, volume and tone appropriate; no push or pressure; no rapid speech     Mood/Affect: Calm, affect congruent to situation   Thought Process/Content: Coherent, linear; no reports or evidence of auditory hallucinations; no reports or evidence of visual hallucinations    Thought Associations: Logical, no looseness of associations, no flight of ideas, no tangentially, no circumstantiality   Insight/Judgment: Adequate insight into condition and need for treatment; judgement not impaired   Orientation: Alert and oriented to person place, time, and circumstance   Recent and Past Memory: Good    Attention Span/Concentration: Good     Language: English with estimated vocabulary to be average or above   Fund of Knowledge: Estimated to be average or above fund of knowledge      SAFETY PLAN - NA, no SI or SIB     RECOMMENDATIONS     Based on a medical records review and the results of this encounter, it is recommended that Yanelis participate in the following behavioral health services.      Mental Health Outpatient Psychotherapy:  Individual-focused skills and habit training with therapeutic processing to improve mental health and wellbeing, conducted by Dr. Vega   Psychiatric Evaluation and Services: Continue consulting with Dr. Driscoll for psychiatric care including psychiatric diagnostic clarification, medication management, care coordination, and psychotherapy as needed    Primary and Specialty Care Services: Continue consulting with specialty care providers for cancer and PCP for healthcare, medication management, and care coordination as needed        DISPOSITION AND PLAN      Psychotherapy was conducted with Yanelis to make progress on treatment goals, enhance daily functioning, and improve mental health and wellbeing. Motivational interviewing was used to promote insight and engagement and to personalize psychotherapy interventions. If applicable, a safety plan was formulated and reviewed with Yanelis.      The engagement of Yanelis during the session is assessed by this provider as: High     The progress of Dottieeleanor on treatment plan goals and objectives is assessed by this provider as: Moderate        In line with feedback informed therapy, Yanelis was guided to complete ratings of perceived Helpfulness and Hopefulness regarding the current encounter, and the results and are recorded below. The results were collaboratively discussed to make adjustments or improvements in subsequent psychotherapy encounters.      Degree of helpfulness of this visit on a scale ranging from 0 is Not Helpful to 10 is Very Helpful - Historical to Current: Did not review  Degree of hopefulness in improving mental health and wellbeing on a scale ranging from 0 Not Hopeful to 10 Very Hopeful - Historical to Current: Did not review    The plan is for Yanelis to follow up with this provider for outpatient psychotherapy and will seek additional recommended services as indicated to continue work on treatment goals.     If there are any  questions regarding this information please contact the [facility] at [phone].    David Vega, PhD, LP   Professor of Psychiatry and Behavioral Sciences  [facility]

## 2025-01-08 ASSESSMENT — PATIENT HEALTH QUESTIONNAIRE - PHQ9
SUM OF ALL RESPONSES TO PHQ QUESTIONS 1-9: 17
10. IF YOU CHECKED OFF ANY PROBLEMS, HOW DIFFICULT HAVE THESE PROBLEMS MADE IT FOR YOU TO DO YOUR WORK, TAKE CARE OF THINGS AT HOME, OR GET ALONG WITH OTHER PEOPLE: VERY DIFFICULT
SUM OF ALL RESPONSES TO PHQ QUESTIONS 1-9: 17

## 2025-01-09 ENCOUNTER — VIRTUAL VISIT (OUTPATIENT)
Dept: PSYCHIATRY | Facility: CLINIC | Age: 50
End: 2025-01-09
Attending: PSYCHOLOGIST
Payer: COMMERCIAL

## 2025-01-09 DIAGNOSIS — F41.9 ANXIETY DISORDER, UNSPECIFIED TYPE: ICD-10-CM

## 2025-01-09 DIAGNOSIS — F33.1 MODERATE EPISODE OF RECURRENT MAJOR DEPRESSIVE DISORDER (H): ICD-10-CM

## 2025-01-09 DIAGNOSIS — F90.0 ATTENTION DEFICIT HYPERACTIVITY DISORDER (ADHD), PREDOMINANTLY INATTENTIVE TYPE: Primary | ICD-10-CM

## 2025-01-11 NOTE — PROGRESS NOTES
[facility]  [address], [address]  [place] [address]  [phone]     OUTPATIENT PSYCHOTHERAPY ENCOUNTER      PATIENT     Name: Yanelis Lyons  YOB: 1975     SERVICES PROVIDED    Date of Service: 1/9/2025   Time of Service: 204 to 302 pm (58 Minutes)   Type of Service: 00520 Individual Psychotherapy (53+ min)   Service Provider: David Vega, PhD, LP     TELEMEDICINE ENCOUNTER METHODS     Telemedicine psychotherapy was conducted due to the preference Yanelis during scheduling. The patient's condition was safely assessed and treated via synchronous audio and visual telemedicine encounter. A secure real time interactive audio and visual telecommunication system (videoconference via [employer]) was used for the visit.     Patient Visit Location: Home in [place]      Present For Encounter: Yanelis     Provider Visit Location: HIPAA compliant location within provider home       ENCOUNTER SYNOPSIS     Yanelis participated in a psychotherapy session today with David Vega, PhD, LP. Background information, history, current condition, and available medical record notes were reviewed, and a brief clinical interview was conducted with Yanelis to update the treatment focus and planning as indicated. There was opportunity for Yanelis to discuss and process recent life happenings. The session included providing Yanelis with an evidence-informed, strengths-based, whole-person mental health and wellbeing-focused model of psychotherapy (see Treatment Plan and Psychotherapy Interventions). Coordination of care was also planned with other healthcare providers working with Yanelis if indicated.     BACKGROUND INFORMATION (From 7/8/2024 Intake; Updated 7/18/2024)      Yanelis lives with her father and brother.  She has a BA degree in physics and a BA in English. 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    Yanelis is currently unemployed and her more recent work history is \"spotty.\"  She has reportedly been fired from many jobs due to ADHD symptoms.  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    Yanelis was diagnosed with breast cancer in 2022.  She was successfully treated with chemotherapy, mastectomy, and radiation.    Yanelis is receiving mental health care.  She receives psychiatric care from Dr. Leora Driscoll.  Her diagnosis is reported to be ADHD and she is currently on Adderall.  In addition, she participates in ADHD support groups.    Currently, Yanelis is seeking ADHD informed psychotherapy with the current provider.  Her goal is to become \"reliable and productive again\" and learn how to deal with her executive functioning challenges.    Psychiatric Symptoms         The following symptoms and concerns are to be targeted in the treatment of Yanelis.      Inattention/Hyperactivity/Impulsivity: Reported she was diagnosed with ADHD at the age of 34; even though she is on Adderall, Yanelis reported current symptoms of inattention and to some degree also hyperactivity/impulsivity; in particular she gets overwhelmed with work/tasks and shuts down; indicated her executive functioning is poor and especially being disorganized, having a hard time staying focused, and getting frustrated a lot   Depression: Reported mild recurrent depressive symptoms including recently sadness, irritability, decreased energy, low motivation, poor concentration, sleep disturbance, negative thoughts    Hypomania/Josephine: None reported    Anxiety: Reported recurrent symptoms of worrying, ruminating, nervousness, heightened physical tension, avoidance     Panic: None reported   Obsessions/Compulsions: None reported   Post-Traumatic Stress: None reported   Sleep " "Problems and Fatigue: Reported her sleep is a \"mess\" since being treated for cancer with side effects coming from the many medications she has been on; noted she uses trazodone intermittently for sleep as needed; had 2 prior sleep studies and was determined to be \"normal\" without apnea or restlessness  Alcohol Abuse: None reported   Drug Abuse: None reported     Psychotic: None reported     Suicidal Thoughts: None reported recently; had suicidal ideation as a teenager    Cutting/Self-Injury: None reported         Functional Concerns      The following functional concerns are to be targeted in the treatment of Yanelis.      Home: Reported struggling with household tasks and upkeep      School: Reported having difficulty being organized and completing homework and academic tasks in high school and college      Work: Reported is distractible, off task, forgets things, etc. on the job and she has been fired from several jobs as a result    Financial: Reported prior history of significant credit card debt due to overspending        History     The following reflect pertinent historical findings identified for Yanelis.      Developmental Delays: None reported    Family Problems: Reported that her mother was impatient with her growing up and often yelled at her; indicated that her mother passed away in 2021 from a lung disease; observed that her mother was \"the center of the family\" and the family is struggling without her    Child Abuse/Neglect/Trauma: None reported    Educational Problems: None reported    Mental Health Problems: Reported had undiagnosed depression during her teenage years and that she was officially diagnosed while she attended college; noted she has been struggling with recurrent depressive symptoms throughout adulthood    Physical Health Problems: Reported prior history of breast cancer that was successfully treated and is currently in remission; noted recently being diagnosed with postural " orthostatic tachycardia syndrome (POTS)  Family History of Mental Health Problems: Reported suspicion that her father has ADHD but this has not been officially diagnosed; believes her brother is depressed     Other Problems: None reported      Strengths and Protective Factors      The following reflect strengths and protective factors that could be leveraged in treatment of Yanelis.      Personal: Reported she is persistent and that she is good with knitting, sewing, cross stitching, and painting     Support System: Reported to be her father and brother      CHECK-IN FOR THIS ENCOUNTER     Recent Ratings (Submitted on 1/9/2025)    PHQ9 Total Score: 17 (moderately severe depression)     Recent Happenings     Checked in with Yanelis to review how it has been going recently     Noteworthy Events: Reported mostly routine events lately  Noteworthy Stressors/Problems: Reported ongoing concerns and stress related to health problems noting she needs to schedule appointments for eye care, psychiatry, and cancer follow-up         Recent Health Concerns     Checked in with Yanelis to review current health concerns and rated them ranging from 0 = Currently Low Concern to 10 = Currently Very High Concern.      Emotional Health Concerns: Current level of concern about overall distress, anxiety, depression, or stress - Historical to Current: Moderate; moderate; moderate; moderate to high; moderate; moderate; moderate to high; moderate; moderate; low to moderate; low to moderate; moderate to high; moderate to high; moderate; moderate; moderate to high  Notes: Reported has been struggling with depression lately          Physical Health Concerns: Current level of concern about overall physical health, physical activity, body weight, health habits, or pain - Historical to Current: Moderate; moderate; moderate; moderate; moderate; low to moderate; moderate; low; low; low; low; low; low; low to moderate; low; low to  moderate  Notes: Reported again feeling generally healthy and has continued to make some progress with her sleep         Living Health Concerns: Current level of concern about functioning at home, at school and/or work, and in important relationships - Historical to Current: NA  Notes: Did not review    Recent Safety Concerns    Self: None reported   Others: None reported       TREATMENT PLAN AND PSYCHOTHERAPY UPDATE FOR THIS ENCOUNTER     Diagnoses Being Treated      Yanelis qualifies for diagnoses of ADHD-I; major depression, recurrent, moderate (symptoms have been more severe across several encounters warranting a change from mild to moderate); anxiety disorder, unspecified     Treatment Symptoms Goals Addressed and Progress      Based on the Background Information above, the following personalized psychiatric symptom domains for Yanelis are targeted for improvement through psychotherapy. The goal is to achieve an 8 or higher over the course of treatment. Progress to date ranges from 0 Limited to 5 Some to 10 Significant. This progress is episodically reviewed with Yanelis to make treatment decisions.     Improve Understanding of Condition/Illness: Baseline estimated to be 6; 7; 8; 8; 8; 8  Reduce Inattention: Baseline estimated to be 5; 6; 6; 7; 6; 6  Reduce Depression: Baseline estimated to be 5; 6; 6; 6; 6; 5  Reduce Anxiety: Baseline estimated to be 6; 6; 6; 5; 6; 5  Reduce Sleep Problems and Fatigue: Baseline estimated to be 5; 6; 7; 7; 7; 6    Functional Goals Addressed and Progress      Based on the Background Information above, the following personalized functional domains for Yanelis are targeted for improvement through psychotherapy. The goal is to achieve an 8 or higher over the course of treatment. Progress to date ranges from 0 Limited to 5 Some to 10 Significant. This progress is episodically reviewed with Yanelis to make treatment decisions.     Home: Baseline estimated to be 5; 6; 7; 7;  6; 5  School: NA currently  Work: NA currently  Financial: NA currently    Mental Health and Wellbeing Health Practices Addressed and Progress     Using a brief psychotherapy collaborative decision aid, Yanelis was guided to make self-ratings for perceived level of mastery in 12 Health Practices that are aligned with Four Worlds of Mental Health and Wellbeing. Ratings range from 0 = Not Good at This to 10 = Great at This. Ratings below were recorded on 7/8/2024.      Internal World: Regulation of Emotions, Thoughts, and Stress   Soothe Health Practice: I am calm and manage my emotions - Historical to Current: 5.5  Think Health Practice: I have positive, hopeful and helpful thoughts - Historical to Current: 5 (indicated being self-critical)  Rest Health Practice: I am well rested and energized - Historical to Current: 4    Physical World: Physical Health      Hydrate Health Practice: I drink enough water every day and stay hydrated - Historical to Current: 6  Nourish Health Practice: I eat healthy - Historical to Current: 4 (indicated does not eat breakfast)  Move Health Practice: I am physically active - Historical to Current: 2    External World: Social Relationships and Productive Behaviors   Express Health Practice: I listen to others and express myself well  - Historical to Current: 8  Connect Health Practice: I have positive, healthy and meaningful relationships - Historical to Current: 7  Activate Health Practice: I am responsible and successful at home, work, school - Historical to Current: 3      Spiritual World: Contemplation, Purpose and Meaning, and Living According to Beliefs   Observe Health Practice: I am thankful, accepting, and live in the moment - Historical to Current: 7.5  Seek Health Practice: I do things that give me meaning and purpose - Historical to Current: 7.5  Believe Health Practice: My values and beliefs guide how I am living - Historical to Current: 7.5    Psychotherapy Interventions  Provided to Improve Symptoms, Functioning, and Mental Health and Wellbeing      Again discussed and processed Yanelis's ongoing struggles with her health stress and depression.  She finds it difficult to keep up with all of her medical appointments and procedures.  Additionally she is worried about politics and how upcoming presidential change could impact her friends who are LGBTQ.  Supported and validated Yanelis regarding her stress and the impact on her emotional health.  Encouraged her to make sure to schedule her eye care, psychiatry, and cancer follow-up appointments in the near future.  Reviewed the wisdom of consuming less news so that it does not stress her out as much.            This session also focused on a strengths-based, whole-person mental health and wellbeing-focused model of psychotherapy for Christel. This approach incorporates empirically validated practice elements derived from cognitive behavioral therapy, acceptance and commitment therapy, positive psychology, mindfulness, and habit formation science. Specific psychotherapeutic modules within the model are personalized to help Yanelis work on and make progress with treatment goals. Culturally and trauma informed care principles are incorporated to contextualize therapeutic strategies to best fit the life experiences and views of Yanelis as indicated.     Based on above ratings and patient preference, Yanelis was guided to work on personalized psychotherapeutic modules within identified Health Practices that are italicized below to improve daily functioning and overall mental health and wellbeing. Instruction, training, guidance, along with traditional therapeutic processing, is used to promote growth in these areas for Yanelis.      Rest Health Practice: Renewing Your Energy, Being Well Rested, and Getting Good Sleep  Sleep Well Module: Building the habits and routines needed to sleep well every night (cognitive behavioral therapy  and habit formation)    Conquer Insomnia Module: Using proven strategies for overcoming sleeplessness (cognitive behavioral therapy and habit formation)  Generate More Energy Module: Having a routine of doing revitalizing activities (behavioral activation and cognitive behavioral therapy)    Notes: Reviewed and reinforced Yanelis to utilize sleep hygiene habits with a focus on improving her sleep schedule of midnight to 8 AM; noted not making slight progress and is using the [employer] deb to help her wind down prior to going to bed     Soothe Health Practice: Being Calm and Managing Your Emotions   Calm Your Body and Mind Module: Calming your body and mind whenever you are stressed (cognitive behavioral therapy and acceptance commitment therapy)   Practice Meditation Module: Being calmer by practicing meditation for a few moments each day (mindfulness, cognitive behavioral therapy, and positive psychology)  Embrace Discomfort Module: Using skills to cope with distress (cognitive behavioral therapy and acceptance commitment therapy)   Notes: Reviewed and reinforced Yanelis again to utilize calm your body and mind strategy along with breathing meditation      Activate Health Practice: Being Responsible and Successful at Home, Work, and/or School  Conquer Avoidance Module: Avoiding less and actively doing more of what matters each day (behavioral activation and cognitive behavioral therapy)   Accomplish More Module: Doing what needs to be done each day (executive functioning skills, behavioral activation, and habit formation)  Solve Problems Module: Overcome everyday problems to be more successful in what matters to you (executive functioning skills and cognitive behavioral therapy)  Notes: Reviewed and reinforced Yanelis again for working on being more activated including maintaining a regular sleep schedule; being more active and healthy eating schedule; she intends to get back on track related to gentle  exercise for her POTS    Express Health Practice: Listening to Others and Expressing Yourself Well  Express Well Module: Expressing yourself clearly and effectively especially during meaningful or challenging conversations with others (communication skills, cognitive behavioral therapy, and positive psychology)  Listen Well Module: Actively listening to understand others especially during meaningful or challenging conversations (communication skills, cognitive behavioral therapy, and positive psychology)   Embrace Differences Module: Calmly working out disagreements with others (communication skills, cognitive behavioral therapy, and positive psychology)   Notes: Did not review or discuss      MENTAL STATUS EXAM     Yanelis was observed for the following indicators of mental status.     Appearance/Behavior: Adequate grooming, appropriate attire, good eye contact, calm, cooperative, no abnormal movements   Speech: Rate, volume and tone appropriate; no push or pressure; no rapid speech     Mood/Affect: Calm, affect congruent to situation   Thought Process/Content: Coherent, linear; no reports or evidence of auditory hallucinations; no reports or evidence of visual hallucinations    Thought Associations: Logical, no looseness of associations, no flight of ideas, no tangentially, no circumstantiality   Insight/Judgment: Adequate insight into condition and need for treatment; judgement not impaired   Orientation: Alert and oriented to person place, time, and circumstance   Recent and Past Memory: Good    Attention Span/Concentration: Good     Language: English with estimated vocabulary to be average or above   Fund of Knowledge: Estimated to be average or above fund of knowledge      SAFETY PLAN - NA, no SI or SIB     RECOMMENDATIONS     Based on a medical records review and the results of this encounter, it is recommended that Yanelis participate in the following behavioral health services.      Mental Health  Outpatient Psychotherapy: Individual-focused skills and habit training with therapeutic processing to improve mental health and wellbeing, conducted by Dr. Vega   Psychiatric Evaluation and Services: Continue consulting with Dr. Driscoll for psychiatric care including psychiatric diagnostic clarification, medication management, care coordination, and psychotherapy as needed    Primary and Specialty Care Services: Continue consulting with specialty care providers for cancer and PCP for healthcare, medication management, and care coordination as needed        DISPOSITION AND PLAN      Psychotherapy was conducted with Yanelis to make progress on treatment goals, enhance daily functioning, and improve mental health and wellbeing. Motivational interviewing was used to promote insight and engagement and to personalize psychotherapy interventions. If applicable, a safety plan was formulated and reviewed with Yanelis.      The engagement of Yanelis during the session is assessed by this provider as: High     The progress of Etienne on treatment plan goals and objectives is assessed by this provider as: Moderate        In line with feedback informed therapy, Yanelis was guided to complete ratings of perceived Helpfulness and Hopefulness regarding the current encounter, and the results and are recorded below. The results were collaboratively discussed to make adjustments or improvements in subsequent psychotherapy encounters.      Degree of helpfulness of this visit on a scale ranging from 0 is Not Helpful to 10 is Very Helpful - Historical to Current: Did not review  Degree of hopefulness in improving mental health and wellbeing on a scale ranging from 0 Not Hopeful to 10 Very Hopeful - Historical to Current: Did not review    The plan is for Yanelis to follow up with this provider for outpatient psychotherapy and will seek additional recommended services as indicated to continue work on treatment goals.      If there are any questions regarding this information please contact the [facility] at [phone].    David Vega, PhD, LP   Professor of Psychiatry and Behavioral Sciences  [facility]

## 2025-01-23 ENCOUNTER — VIRTUAL VISIT (OUTPATIENT)
Dept: PSYCHIATRY | Facility: CLINIC | Age: 50
End: 2025-01-23
Attending: PSYCHOLOGIST
Payer: COMMERCIAL

## 2025-01-23 DIAGNOSIS — F41.9 ANXIETY DISORDER, UNSPECIFIED TYPE: ICD-10-CM

## 2025-01-23 DIAGNOSIS — F33.1 MODERATE EPISODE OF RECURRENT MAJOR DEPRESSIVE DISORDER (H): ICD-10-CM

## 2025-01-23 DIAGNOSIS — F90.0 ATTENTION DEFICIT HYPERACTIVITY DISORDER (ADHD), PREDOMINANTLY INATTENTIVE TYPE: Primary | ICD-10-CM

## 2025-01-23 PROCEDURE — 90837 PSYTX W PT 60 MINUTES: CPT | Mod: 95 | Performed by: PSYCHOLOGIST

## 2025-02-13 ENCOUNTER — VIRTUAL VISIT (OUTPATIENT)
Dept: PSYCHIATRY | Facility: CLINIC | Age: 50
End: 2025-02-13
Attending: PSYCHOLOGIST
Payer: COMMERCIAL

## 2025-02-13 DIAGNOSIS — F33.1 MODERATE EPISODE OF RECURRENT MAJOR DEPRESSIVE DISORDER (H): ICD-10-CM

## 2025-02-13 DIAGNOSIS — F41.9 ANXIETY DISORDER, UNSPECIFIED TYPE: ICD-10-CM

## 2025-02-13 DIAGNOSIS — F90.0 ATTENTION DEFICIT HYPERACTIVITY DISORDER (ADHD), PREDOMINANTLY INATTENTIVE TYPE: Primary | ICD-10-CM

## 2025-02-13 PROCEDURE — 90837 PSYTX W PT 60 MINUTES: CPT | Mod: 95 | Performed by: PSYCHOLOGIST

## 2025-02-13 ASSESSMENT — PATIENT HEALTH QUESTIONNAIRE - PHQ9
10. IF YOU CHECKED OFF ANY PROBLEMS, HOW DIFFICULT HAVE THESE PROBLEMS MADE IT FOR YOU TO DO YOUR WORK, TAKE CARE OF THINGS AT HOME, OR GET ALONG WITH OTHER PEOPLE: VERY DIFFICULT
SUM OF ALL RESPONSES TO PHQ QUESTIONS 1-9: 12
SUM OF ALL RESPONSES TO PHQ QUESTIONS 1-9: 12
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
## 2025-03-06 ENCOUNTER — VIRTUAL VISIT (OUTPATIENT)
Dept: PSYCHIATRY | Facility: CLINIC | Age: 50
End: 2025-03-06
Attending: PSYCHOLOGIST
Payer: COMMERCIAL

## 2025-03-06 DIAGNOSIS — F41.9 ANXIETY DISORDER, UNSPECIFIED TYPE: ICD-10-CM

## 2025-03-06 DIAGNOSIS — F33.1 MODERATE EPISODE OF RECURRENT MAJOR DEPRESSIVE DISORDER (H): Primary | ICD-10-CM

## 2025-03-06 DIAGNOSIS — F90.0 ATTENTION DEFICIT HYPERACTIVITY DISORDER (ADHD), PREDOMINANTLY INATTENTIVE TYPE: ICD-10-CM

## 2025-03-06 PROCEDURE — 90837 PSYTX W PT 60 MINUTES: CPT | Mod: 95 | Performed by: PSYCHOLOGIST

## 2025-03-08 NOTE — PROGRESS NOTES
[facility]  [address]  [place] [address]  [phone]     OUTPATIENT PSYCHOTHERAPY ENCOUNTER      PATIENT     Name: Yanelis Lyons  YOB: 1975     SERVICES PROVIDED    Date of Service: 3/6/2025   Time of Service: 908 to 1006 am (58 Minutes)   Type of Service: 12701 Individual Psychotherapy (53+ min)   Service Provider: David Vega, , LP     TELEMEDICINE ENCOUNTER METHODS     Telemedicine psychotherapy was conducted due to the preference Yanelis during scheduling. The patient's condition was safely assessed and treated via synchronous audio and visual telemedicine encounter. A secure real time interactive audio and visual telecommunication system (videoconference via Night Up) was used for the visit.     Patient Visit Location: Home in [place]      Present For Encounter: Yanelis     Provider Visit Location: HIPAA compliant location within provider home       ENCOUNTER SYNOPSIS     Yanelis participated in a psychotherapy session today with David Vega, PhD, LP. Background information, history, current condition, and available medical record notes were reviewed, and a brief clinical interview was conducted with Yanelis to update the treatment focus and planning as indicated. There was opportunity for Yanelis to discuss and process recent life happenings. The session included providing Yanelis with an evidence-informed, strengths-based, whole-person mental health and wellbeing-focused model of psychotherapy (see Treatment Plan and Psychotherapy Interventions). Coordination of care was also planned with other healthcare providers working with Yanelis if indicated.     BACKGROUND INFORMATION (From 7/8/2024 Intake; Updated 7/18/2024 and 2/20/2025)      Yanelis lives with her father and brother.  She has a BA degree in physics and a BA in English. 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    Yanelis is currently unemployed and her more recent work history is \"spotty.\"  She has reportedly been fired from many jobs due to ADHD symptoms.  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    Yanelis was diagnosed with breast cancer in 2022.  She was successfully treated with chemotherapy, mastectomy, and radiation.    Yanelis is receiving mental health care.  She receives psychiatric care from Dr. Leora Driscoll.  Her diagnosis is reported to be ADHD and she is currently on Adderall.  In addition, she participates in ADHD support groups.    Currently, Yanelis is seeking ADHD informed psychotherapy with the current provider.  Her goal is to become \"reliable and productive again\" and learn how to deal with her executive functioning challenges.    Psychiatric Symptoms         The following symptoms and concerns are to be targeted in the treatment of Yanelis.      Inattention/Hyperactivity/Impulsivity: Reported she was diagnosed with ADHD at the age of 34; even though she is on Adderall, Yanelis reported current symptoms of inattention and to some degree also hyperactivity/impulsivity; in particular she gets overwhelmed with work/tasks and shuts down; indicated her executive functioning is poor and especially being disorganized, having a hard time staying focused, and getting frustrated a lot   Depression: Reported mild recurrent depressive symptoms including recently sadness, irritability, decreased energy, low motivation, poor concentration, sleep disturbance, negative thoughts    Hypomania/Josephine: None reported    Anxiety: Reported recurrent symptoms of worrying, ruminating, nervousness, heightened physical tension, avoidance     Panic: None reported   Obsessions/Compulsions: None reported   Post-Traumatic Stress: None reported   Sleep " "Problems and Fatigue: Reported her sleep is a \"mess\" since being treated for cancer with side effects coming from the many medications she has been on; noted she uses trazodone intermittently for sleep as needed; had 2 prior sleep studies and was determined to be \"normal\" without apnea or restlessness  Alcohol Abuse: None reported   Drug Abuse: None reported     Psychotic: None reported     Suicidal Thoughts: None reported recently; had suicidal ideation as a teenager    Cutting/Self-Injury: None reported currently; had self punching/hitting in her teens to about age 23         Functional Concerns      The following functional concerns are to be targeted in the treatment of Yanelis.      Home: Reported struggling with household tasks and upkeep      School: Reported having difficulty being organized and completing homework and academic tasks in high school and college      Work: Reported is distractible, off task, forgets things, etc. on the job and she has been fired from several jobs as a result    Financial: Reported prior history of significant credit card debt due to overspending        History     The following reflect pertinent historical findings identified for Yanelis.      Developmental Delays: None reported    Family Problems: Reported that her mother was impatient with her growing up and often yelled at her; indicated that her mother passed away in 2021 from a lung disease; observed that her mother was \"the center of the family\" and the family is struggling without her    Child Abuse/Neglect/Trauma: None reported    Educational Problems: None reported    Mental Health Problems: Reported had undiagnosed depression during her teenage years and that she was officially diagnosed while she attended college; noted she has been struggling with recurrent depressive symptoms throughout adulthood    Physical Health Problems: Reported prior history of breast cancer that was successfully treated and is currently " in remission; noted recently being diagnosed with postural orthostatic tachycardia syndrome (POTS)  Family History of Mental Health Problems: Reported suspicion that her father has ADHD but this has not been officially diagnosed; believes her brother is depressed     Other Problems: None reported      Strengths and Protective Factors      The following reflect strengths and protective factors that could be leveraged in treatment of Yanelis.      Personal: Reported she is persistent and that she is good with knitting, sewing, cross stitching, and painting     Support System: Reported to be her father and brother      CHECK-IN FOR THIS ENCOUNTER     Recent Happenings     Checked in with Yanelis to review how it has been going recently     Noteworthy Events: Reported routine life events   Noteworthy Stressors/Problems: Reported being very stressed out about political and federal governmental developments and worries that possible cuts in Social Security may affect her livelihood and that of her brother and father (who all receive some form of Social Security), so the entire family            Recent Health Concerns     Checked in with Yanelis to review current health concerns and rated them ranging from 0 = Currently Low Concern to 10 = Currently Very High Concern.      Emotional Health Concerns: Current level of concern about overall distress, anxiety, depression, or stress - Historical to Current: Moderate; moderate; moderate; moderate to high; moderate; moderate; moderate to high; moderate; moderate; low to moderate; low to moderate; moderate to high; moderate to high; moderate; moderate; moderate to high; moderate to high; moderate to high; moderate; moderate to high; moderate to high  Notes: Reported moderate stress and anxiety and frustration with politics             Physical Health Concerns: Current level of concern about overall physical health, physical activity, body weight, health habits, or pain -  Historical to Current: Moderate; moderate; moderate; moderate; moderate; low to moderate; moderate; low; low; low; low; low; low; low to moderate; low; low to moderate; low to moderate; low to moderate; low to moderate; low to moderate; low to moderate  Notes: Reported again feeling generally healthy and sleeping okay         Living Health Concerns: Current level of concern about functioning at home, at school and/or work, and in important relationships - Historical to Current: NA  Notes: Did not review    Recent Safety Concerns    Self: None reported   Others: None reported       TREATMENT PLAN AND PSYCHOTHERAPY UPDATE FOR THIS ENCOUNTER     Diagnoses Being Treated      Yanelis qualifies for diagnoses of major depression, recurrent, moderate (symptoms have been more severe across several encounters warranting a change from mild to moderate); ADHD-I; anxiety disorder, unspecified     Treatment Symptoms Goals Addressed and Progress      Based on the Background Information above, the following personalized psychiatric symptom domains for Yanelis are targeted for improvement through psychotherapy. The goal is to achieve an 8 or higher over the course of treatment. Progress to date ranges from 0 Limited to 5 Some to 10 Significant. This progress is episodically reviewed with Yanelis to make treatment decisions.     Improve Understanding of Condition/Illness: Baseline estimated to be 6; 7; 8; 8; 8; 8  Reduce Inattention: Baseline estimated to be 5; 6; 6; 7; 6; 6  Reduce Depression: Baseline estimated to be 5; 6; 6; 6; 6; 5  Reduce Anxiety: Baseline estimated to be 6; 6; 6; 5; 6; 5  Reduce Sleep Problems and Fatigue: Baseline estimated to be 5; 6; 7; 7; 7; 6    Functional Goals Addressed and Progress      Based on the Background Information above, the following personalized functional domains for Yanelis are targeted for improvement through psychotherapy. The goal is to achieve an 8 or higher over the course of  treatment. Progress to date ranges from 0 Limited to 5 Some to 10 Significant. This progress is episodically reviewed with Yanelis to make treatment decisions.     Home: Baseline estimated to be 5; 6; 7; 7; 6; 5  School: NA currently  Work: NA currently  Financial: NA currently    Mental Health and Wellbeing Health Practices Addressed and Progress     Using a brief psychotherapy collaborative decision aid, Yanelis was guided to make self-ratings for perceived level of mastery in 12 Health Practices that are aligned with [facility] of Mental Health and Wellbeing. Ratings range from 0 = Not Good at This to 10 = Great at This. Ratings below were recorded on 7/8/2024.      Brain Capacity and Health  Rest Health Practice: I am well rested and energized -4  Hydrate Health Practice: I drink enough water every day and stay hydrated -6  Nourish Health Practice: I eat healthy -4  Move Health Practice: I am physically active -2    Daily and Relationship Functioning   Activate Health Practice: I am responsible and successful at home, work, school -3  Express Health Practice: I listen to others and express myself well -8  Connect Health Practice: I have positive, healthy and meaningful relationships -7    Emotions  Soothe Health Practice: I am calm and manage my emotions -5.5  Think Health Practice: I have positive, hopeful and helpful thoughts -5  Observe Health Practice: I am thankful, accepting, and live in the moment -7.5    What Matters in Life  Seek Health Practice: I do things that give me meaning and purpose -7.5  Believe Health Practice: My values and beliefs guide how I am living -7.5    Psychotherapy Interventions Provided to Improve Symptoms, Functioning, and Mental Health and Wellbeing      The first part of the session focused briefly on reviewing past discussions on family dynamics, especially her relationship with her mother.  Encouraged Yanelis to learn from the past and to look forward more to work on  things that are important in her life.  Supported and validated Yanelis regarding lingering negative emotions, grief and thoughts regarding her family upbringing.      To go forward, focused on behavioral activation (see below).      This session also focused on a strengths-based, whole-person mental health and wellbeing-focused model of psychotherapy for Christel. This approach incorporates empirically validated practice elements derived from cognitive behavioral therapy, acceptance and commitment therapy, positive psychology, mindfulness, and habit formation science. Specific psychotherapeutic modules within the model are personalized to help Yanelis work on and make progress with treatment goals. Culturally and trauma informed care principles are incorporated to contextualize therapeutic strategies to best fit the life experiences and views of Yanelis as indicated.     Based on above ratings and patient preference, Yanelis was guided to work on personalized psychotherapeutic modules within identified Health Practices that are italicized below to improve daily functioning and overall mental health and wellbeing. Instruction, training, guidance, along with traditional therapeutic processing, is used to promote growth in these areas for Yanelis.      Activate Health Practice: Being Responsible and Successful at Home, Work, and/or School  Conquer Avoidance Module: Avoiding less and actively doing more of what matters each day (behavioral activation and cognitive behavioral therapy)   Accomplish More Module: Doing what needs to be done each day (executive functioning skills, behavioral activation, and habit formation)  Solve Problems Module: Overcome everyday problems to be more successful in what matters to you (executive functioning skills and cognitive behavioral therapy)  Notes: Reviewed and reinforced Yanelis again for working on being more activated; she is attempting to prioritize sleep and healthier  "eating, and is making some small progress; focused on doing more things around the house with intentions to clean the living room, bedroom, and bathroom, but it is overwhelming, stressful, and unpleasant, and triggers old thoughts and feelings; reviewed, discussed, and plan for how Yanelis can redirect from task avoidance to task during habit, emphasizing elements of executive functioning planning and stress management; she will attempt to make progress by engaging in daily 30-minute cleaning blocks of time, rather than focusing on the large tasks themselves; also emphasized redirecting from self-critical thought to affirming thought such as \"what I do is fine\"       Rest Health Practice: Renewing Your Energy, Being Well Rested, and Getting Good Sleep  Sleep Well Module: Building the habits and routines needed to sleep well every night (cognitive behavioral therapy and habit formation)    Conquer Insomnia Module: Using proven strategies for overcoming sleeplessness (cognitive behavioral therapy and habit formation)  Generate More Energy Module: Having a routine of doing revitalizing activities (behavioral activation and cognitive behavioral therapy)    Notes: Briefly reviewed and reinforced Yanelis again to utilize sleep hygiene habits with a focus on improving her sleep schedule of midnight to 8 AM      Soothe Health Practice: Being Calm and Managing Your Emotions   Calm Your Body and Mind Module: Calming your body and mind whenever you are stressed (cognitive behavioral therapy and acceptance commitment therapy)   Practice Meditation Module: Being calmer by practicing meditation for a few moments each day (mindfulness, cognitive behavioral therapy, and positive psychology)  Embrace Discomfort Module: Using skills to cope with distress (cognitive behavioral therapy and acceptance commitment therapy)   Notes: Briefly reviewed and reinforced Yanelis again to utilize calm your body and mind strategy along with " breathing meditation      MENTAL STATUS EXAM     Yanelis was observed for the following indicators of mental status.     Appearance/Behavior: Adequate grooming, appropriate attire, good eye contact, calm, cooperative, no abnormal movements   Speech: Rate, volume and tone appropriate; no push or pressure; no rapid speech     Mood/Affect: Calm, affect congruent to situation   Thought Process/Content: Coherent, linear; no reports or evidence of auditory hallucinations; no reports or evidence of visual hallucinations    Thought Associations: Logical, no looseness of associations, no flight of ideas, no tangentially, no circumstantiality   Insight/Judgment: Adequate insight into condition and need for treatment; judgement not impaired   Orientation: Alert and oriented to person place, time, and circumstance   Recent and Past Memory: Good    Attention Span/Concentration: Good     Language: English with estimated vocabulary to be average or above   Fund of Knowledge: Estimated to be average or above fund of knowledge      SAFETY PLAN - NA, no SI or SIB     RECOMMENDATIONS     Based on a medical records review and the results of this encounter, it is recommended that Yanelis participate in the following behavioral health services.      Mental Health Outpatient Psychotherapy: Individual-focused skills and habit training with therapeutic processing to improve mental health and wellbeing, conducted by Dr. Vega   Psychiatric Evaluation and Services: Continue consulting with Dr. Driscoll for psychiatric care including psychiatric diagnostic clarification, medication management, care coordination, and psychotherapy as needed    Primary and Specialty Care Services: Continue consulting with specialty care providers for cancer and PCP for healthcare, medication management, and care coordination as needed        DISPOSITION AND PLAN      Psychotherapy was conducted with Yanelis to make progress on treatment goals, enhance  daily functioning, and improve mental health and wellbeing. Motivational interviewing was used to promote insight and engagement and to personalize psychotherapy interventions. If applicable, a safety plan was formulated and reviewed with Yanelis.      The engagement of Yanelis during the session is assessed by this provider as: High     The progress of Etienne on treatment plan goals and objectives is assessed by this provider as: Moderate        In line with feedback informed therapy, Yanelis was guided to complete ratings of perceived Helpfulness and Hopefulness regarding the current encounter, and the results and are recorded below. The results were collaboratively discussed to make adjustments or improvements in subsequent psychotherapy encounters.      Degree of helpfulness of this visit on a scale ranging from 0 is Not Helpful to 10 is Very Helpful - Historical to Current: Did not review  Degree of hopefulness in improving mental health and wellbeing on a scale ranging from 0 Not Hopeful to 10 Very Hopeful - Historical to Current: Did not review    The plan is for Yanelis to follow up with this provider for outpatient psychotherapy and will seek additional recommended services as indicated to continue work on treatment goals.     If there are any questions regarding this information please contact the [facility] at [phone].    David Vega, PhD, LP   Professor of Psychiatry and Behavioral Sciences  [facility]

## 2025-04-03 ENCOUNTER — VIRTUAL VISIT (OUTPATIENT)
Dept: PSYCHIATRY | Facility: CLINIC | Age: 50
End: 2025-04-03
Attending: PSYCHOLOGIST
Payer: MEDICARE

## 2025-04-03 DIAGNOSIS — F41.9 ANXIETY DISORDER, UNSPECIFIED TYPE: ICD-10-CM

## 2025-04-03 DIAGNOSIS — F33.1 MODERATE EPISODE OF RECURRENT MAJOR DEPRESSIVE DISORDER (H): Primary | ICD-10-CM

## 2025-04-03 DIAGNOSIS — F90.0 ATTENTION DEFICIT HYPERACTIVITY DISORDER (ADHD), PREDOMINANTLY INATTENTIVE TYPE: ICD-10-CM

## 2025-04-03 PROCEDURE — 90837 PSYTX W PT 60 MINUTES: CPT | Mod: 95 | Performed by: PSYCHOLOGIST

## 2025-04-03 ASSESSMENT — PATIENT HEALTH QUESTIONNAIRE - PHQ9
SUM OF ALL RESPONSES TO PHQ QUESTIONS 1-9: 15
10. IF YOU CHECKED OFF ANY PROBLEMS, HOW DIFFICULT HAVE THESE PROBLEMS MADE IT FOR YOU TO DO YOUR WORK, TAKE CARE OF THINGS AT HOME, OR GET ALONG WITH OTHER PEOPLE: VERY DIFFICULT
SUM OF ALL RESPONSES TO PHQ QUESTIONS 1-9: 15

## 2025-04-03 NOTE — PROGRESS NOTES
[facility]  [address], [address]  [place] [address]  [phone]     OUTPATIENT PSYCHOTHERAPY ENCOUNTER      PATIENT     Name: Yanelis Lyons  YOB: 1975     SERVICES PROVIDED    Date of Service: 4/3/2025   Time of Service: 1013 to 1111 am (58 Minutes)   Type of Service: 51704 Individual Psychotherapy (53+ min)   Service Provider: David Vega, PhD, LP     TELEMEDICINE ENCOUNTER METHODS     Telemedicine psychotherapy was conducted due to the preference Yanelis during scheduling. The patient's condition was safely assessed and treated via synchronous audio and visual telemedicine encounter. A secure real time interactive audio and visual telecommunication system (videoconference via [employer]) was used for the visit.     Patient Visit Location: Home in [place]      Present For Encounter: Yanelis     Provider Visit Location: HIPAA compliant location within provider home       ENCOUNTER SYNOPSIS     Yanelis participated in a psychotherapy session today with David Vega, PhD, LP. Background information, history, current condition, and available medical record notes were reviewed, and a brief clinical interview was conducted with Yanelis to update the treatment focus and planning as indicated. There was opportunity for Yanelis to discuss and process recent life happenings. The session included providing Yanelis with an evidence-informed, strengths-based, whole-person mental health and wellbeing-focused model of psychotherapy (see Treatment Plan and Psychotherapy Interventions). Coordination of care was also planned with other healthcare providers working with Yanelis if indicated.     BACKGROUND INFORMATION (From 7/8/2024 Intake; Updated 7/18/2024, 2/20/2025, 3/20/2025)      Yanelis lives with her father and brother.  She has a BA degree in physics and a BA in English. 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    Yanelis is currently unemployed and her more recent work history is \"spotty.\"  She has reportedly been fired from many jobs due to ADHD symptoms.  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    Yanelis was diagnosed with breast cancer in 2022.  She was successfully treated with chemotherapy, mastectomy, and radiation.    Yanelis is receiving mental health care.  She receives psychiatric care from Dr. Leora Driscoll.  Her diagnosis is reported to be ADHD and she is currently on Adderall.      Yanelis is seeking ADHD informed psychotherapy with the current provider.  Her goal is to become \"reliable and productive again\" and learn how to deal with her executive functioning challenges.    Psychiatric Symptoms         The following symptoms and concerns are to be targeted in the treatment of Yanelis.      Inattention/Hyperactivity/Impulsivity: Reported she was diagnosed with ADHD at the age of 34; even though she is on Adderall, Yanelis reported current symptoms of inattention and to some degree also hyperactivity/impulsivity; in particular she gets overwhelmed with work/tasks and shuts down; indicated her executive functioning is poor and especially being disorganized, having a hard time staying focused, and getting frustrated a lot   Depression: Reported recurrent depressive symptoms including sadness, irritability, decreased energy, low motivation, poor concentration, sleep disturbance, negative thoughts    Hypomania/Josephine: None reported    Anxiety: Reported recurrent symptoms of worrying, ruminating, nervousness, heightened physical tension, avoidance     Panic: None reported " "  Obsessions/Compulsions: None reported   Post-Traumatic Stress: None reported   Sleep Problems: Reported her sleep is a \"mess\" since being treated for cancer with side effects coming from the many medications she has been on; noted she uses trazodone intermittently for sleep as needed; had 2 prior sleep studies and was determined to be \"normal\" without apnea or restlessness  Alcohol Abuse: None reported   Drug Abuse: None reported     Psychotic: None reported     Suicidal Thoughts: None reported recently; had suicidal ideation as a teenager    Cutting/Self-Injury: None reported currently; had self punching/hitting in her teens to about age 23         Functional Concerns      The following functional concerns are to be targeted in the treatment of Yanelis.      Home: Reported struggling with household tasks and upkeep      School: Reported having difficulty being organized and completing homework and academic tasks in high school and college      Work: Reported is distractible, off task, forgets things, etc. on the job and she has been fired from several jobs as a result    Financial: Reported prior history of significant credit card debt due to overspending        History     The following reflect pertinent historical findings identified for Yanelis.      Developmental Delays: None reported    Family Problems: Reported that her mother was impatient with her growing up and often yelled at her; indicated that her mother passed away in 2021 from a lung disease; observed that her mother was \"the center of the family\" and the family is struggling without her    Child Abuse/Neglect/Trauma: None reported    Educational Problems: None reported    Mental Health Problems: Reported had undiagnosed depression during her teenage years and that she was officially diagnosed while she attended college; noted she has been struggling with recurrent depressive symptoms throughout adulthood    Physical Health Problems: Reported " prior history of breast cancer that was successfully treated and is currently in remission; noted recently being diagnosed with postural orthostatic tachycardia syndrome (POTS)  Family History of Mental Health Problems: Reported suspicion that her father has ADHD but this has not been officially diagnosed; believes her brother is depressed     Other Problems: None reported      Strengths and Protective Factors      The following reflect strengths and protective factors that could be leveraged in treatment of Yanelis.      Personal: Reported she is persistent and that she is good with knitting, sewing, cross stitching, and painting     Support System: Reported to be her father and brother      CHECK-IN FOR THIS ENCOUNTER     Recent PHQ9 (Submitted on 4/3/2025)    Total Score:15 (moderately severe depression)     Recent Happenings     Checked in with Yanelis to review how it has been going recently     Noteworthy Events: Reported routine life events   Noteworthy Stressors/Problems: Reported stress related to her father being on a long road trip, necessitating her to do more house cleaning, and shopping/cooking for herself and her brother            Recent Health Concerns     Checked in with Yanelis to review current health concerns and rated them ranging from 0 = Currently Low Concern to 10 = Currently Very High Concern.      Emotional Health Concerns: Current level of concern about overall distress, anxiety, depression, or stress - Historical to Current: Moderate; moderate; moderate; moderate to high; moderate; moderate; moderate to high; moderate; moderate; low to moderate; low to moderate; moderate to high; moderate to high; moderate; moderate; moderate to high; moderate to high; moderate to high; moderate; moderate to high; moderate to high; moderate; moderate  Notes: Reported moderate stress and anxiety              Physical Health Concerns: Current level of concern about overall physical health, physical  activity, body weight, health habits, or pain - Historical to Current: Moderate; moderate; moderate; moderate; moderate; low to moderate; moderate; low; low; low; low; low; low; low to moderate; low; low to moderate; low to moderate; low to moderate; low to moderate; low to moderate; low to moderate; low to moderate; low to moderate  Notes: Reported again feeling generally healthy and sleeping okay         Living Health Concerns: Current level of concern about functioning at home, at school and/or work, and in important relationships - Historical to Current: NA  Notes: Did not review    Recent Safety Concerns    Self: None reported   Others: None reported       TREATMENT PLAN AND PSYCHOTHERAPY UPDATE FOR THIS ENCOUNTER     Diagnoses Being Treated      Yanelis qualifies for diagnoses of major depression, recurrent, moderate; ADHD-I; anxiety disorder, unspecified     Treatment Symptoms Goals Addressed and Progress      Based on the Background Information above, the following personalized psychiatric symptom domains for Yanelis are targeted for improvement through psychotherapy. The goal is to achieve an 8 or higher over the course of treatment. Progress to date ranges from 0 Limited to 5 Some to 10 Significant. This progress is episodically reviewed with Yanelis to make treatment decisions.     Improve Understanding of Condition/Illness: Baseline estimated to be 6; 7; 8; 8; 8; 8  Reduce Inattention: Baseline estimated to be 5; 6; 6; 7; 6; 6  Reduce Depression: Baseline estimated to be 5; 6; 6; 6; 6; 5  Reduce Anxiety: Baseline estimated to be 6; 6; 6; 5; 6; 5  Reduce Sleep Problems and Fatigue: Baseline estimated to be 5; 6; 7; 7; 7; 6    Functional Goals Addressed and Progress      Based on the Background Information above, the following personalized functional domains for Yanelis are targeted for improvement through psychotherapy. The goal is to achieve an 8 or higher over the course of treatment. Progress  to date ranges from 0 Limited to 5 Some to 10 Significant. This progress is episodically reviewed with Yanelis to make treatment decisions.     Home: Baseline estimated to be 5; 6; 7; 7; 6; 5  School: NA currently  Work: NA currently  Financial: NA currently    Mental Health and Wellbeing Health Practices Addressed and Progress     Using a brief psychotherapy collaborative decision aid, Yanelis was guided to make self-ratings for perceived level of mastery in 12 Health Practices that are aligned with [facility] of Mental Health and Wellbeing. Ratings range from 0 = Not Good at This to 10 = Great at This. Ratings below were recorded on 7/8/2024.      Brain Capacity and Health  Rest Health Practice: I am well rested and energized -4  Hydrate Health Practice: I drink enough water every day and stay hydrated -6  Nourish Health Practice: I eat healthy -4  Move Health Practice: I am physically active -2    Daily and Relationship Functioning   Activate Health Practice: I am responsible and successful at home, work, school -3  Express Health Practice: I listen to others and express myself well -8  Connect Health Practice: I have positive, healthy and meaningful relationships -7    Emotions  Soothe Health Practice: I am calm and manage my emotions -5.5  Think Health Practice: I have positive, hopeful and helpful thoughts -5  Observe Health Practice: I am thankful, accepting, and live in the moment -7.5    What Matters in Life  Seek Health Practice: I do things that give me meaning and purpose -7.5  Believe Health Practice: My values and beliefs guide how I am living -7.5    Psychotherapy Interventions Provided to Improve Symptoms, Functioning, and Mental Health and Wellbeing      The first part of the session focused on reviewing past physical and emotional difficulties related to her diagnosis and subsequent treatment for cancer.  Again encouraged Yanelis to learn from overcoming this adversity and to look forward  "more to work on things that are important in her life now and in the future. Supported and validated Yanelis regarding lingering negative emotions related to past cancer treatment.       Again also redirected to focus on behavioral activation (see below).  It was noteworthy that Yanelis indicated she was contemplating \"reengaging with the workforce\" but it was pressing some stress and panic in her.  On the one hand she fears it will be boring and stressful, but on the other hand she knows that she needs to have more of a routine and get out of the house more, and that it would be good for her emotionally to be more productive.    This session also focused on a strengths-based, whole-person mental health and wellbeing-focused model of psychotherapy for Christel. This approach incorporates empirically validated practice elements derived from cognitive behavioral therapy, acceptance and commitment therapy, positive psychology, mindfulness, and habit formation science. Specific psychotherapeutic modules within the model are personalized to help Yanelis work on and make progress with treatment goals. Culturally and trauma informed care principles are incorporated to contextualize therapeutic strategies to best fit the life experiences and views of Yanelis as indicated.     Based on above ratings and patient preference, Yanelis was guided to work on personalized psychotherapeutic modules within identified Health Practices that are italicized below to improve daily functioning and overall mental health and wellbeing. Instruction, training, guidance, along with traditional therapeutic processing, is used to promote growth in these areas for Yanelis.      Activate Health Practice: Being Responsible and Successful at Home, Work, and/or School  Conquer Avoidance Module: Avoiding less and actively doing more of what matters each day (behavioral activation and cognitive behavioral therapy)   Accomplish More Module: " Doing what needs to be done each day (executive functioning skills, behavioral activation, and habit formation)  Solve Problems Module: Overcome everyday problems to be more successful in what matters to you (executive functioning skills and cognitive behavioral therapy)  Notes: Reviewed and reinforced Yanelis again for working on being more activated including sleep habits, healthier eating, small steps of cleaning around the home, spending time with friends and family        Rest Health Practice: Renewing Your Energy, Being Well Rested, and Getting Good Sleep  Sleep Well Module: Building the habits and routines needed to sleep well every night (cognitive behavioral therapy and habit formation)    Conquer Insomnia Module: Using proven strategies for overcoming sleeplessness (cognitive behavioral therapy and habit formation)  Generate More Energy Module: Having a routine of doing revitalizing activities (behavioral activation and cognitive behavioral therapy)    Notes: Briefly reviewed and reinforced Yanelis again to utilize sleep hygiene habits with a focus on improving her sleep schedule of midnight to 8 AM      Soothe Health Practice: Being Calm and Managing Your Emotions   Calm Your Body and Mind Module: Calming your body and mind whenever you are stressed (cognitive behavioral therapy and acceptance commitment therapy)   Practice Meditation Module: Being calmer by practicing meditation for a few moments each day (mindfulness, cognitive behavioral therapy, and positive psychology)  Embrace Discomfort Module: Using skills to cope with distress (cognitive behavioral therapy and acceptance commitment therapy)   Notes: Briefly reviewed and reinforced Yanelis again to utilize calm your body and mind strategy along with breathing meditation      MENTAL STATUS EXAM     Yanelis was observed for the following indicators of mental status.     Appearance/Behavior: Adequate grooming, appropriate attire, good eye  contact, calm, cooperative, no abnormal movements   Speech: Rate, volume and tone appropriate; no push or pressure; no rapid speech     Mood/Affect: Calm, affect congruent to situation   Thought Process/Content: Coherent, linear; no reports or evidence of auditory hallucinations; no reports or evidence of visual hallucinations    Thought Associations: Logical, no looseness of associations, no flight of ideas, no tangentially, no circumstantiality   Insight/Judgment: Adequate insight into condition and need for treatment; judgement not impaired   Orientation: Alert and oriented to person place, time, and circumstance   Recent and Past Memory: Good    Attention Span/Concentration: Good     Language: English with estimated vocabulary to be average or above   Fund of Knowledge: Estimated to be average or above fund of knowledge      SAFETY PLAN - NA, no SI or SIB     RECOMMENDATIONS     Based on a medical records review and the results of this encounter, it is recommended that Yanelis participate in the following behavioral health services.      Mental Health Outpatient Psychotherapy: Individual-focused skills and habit training with therapeutic processing to improve mental health and wellbeing, conducted by Dr. Vega   Psychiatric Evaluation and Services: Continue consulting with Dr. Driscoll for psychiatric care including psychiatric diagnostic clarification, medication management, care coordination, and psychotherapy as needed    Primary and Specialty Care Services: Continue consulting with specialty care providers for cancer and PCP for healthcare, medication management, and care coordination as needed        DISPOSITION AND PLAN      Psychotherapy was conducted with Yanelis to make progress on treatment goals, enhance daily functioning, and improve mental health and wellbeing. Motivational interviewing was used to promote insight and engagement and to personalize psychotherapy interventions. If applicable,  a safety plan was formulated and reviewed with Yanelis.      The engagement of Yanelis during the session is assessed by this provider as: High     The progress of Etienne on treatment plan goals and objectives is assessed by this provider as: Moderate        In line with feedback informed therapy, Yanelis was guided to complete ratings of perceived Helpfulness and Hopefulness regarding the current encounter, and the results and are recorded below. The results were collaboratively discussed to make adjustments or improvements in subsequent psychotherapy encounters.      Degree of helpfulness of this visit on a scale ranging from 0 is Not Helpful to 10 is Very Helpful - Historical to Current: Did not review  Degree of hopefulness in improving mental health and wellbeing on a scale ranging from 0 Not Hopeful to 10 Very Hopeful - Historical to Current: Did not review    The plan is for Yanelis to follow up with this provider for outpatient psychotherapy and will seek additional recommended services as indicated to continue work on treatment goals.     If there are any questions regarding this information please contact the [facility] at [phone].    David Vega, PhD, LP   Professor of Psychiatry and Behavioral Sciences  [facility]

## 2025-04-24 ENCOUNTER — VIRTUAL VISIT (OUTPATIENT)
Dept: PSYCHIATRY | Facility: CLINIC | Age: 50
End: 2025-04-24
Attending: PSYCHOLOGIST
Payer: MEDICARE

## 2025-04-24 DIAGNOSIS — F90.0 ATTENTION DEFICIT HYPERACTIVITY DISORDER (ADHD), PREDOMINANTLY INATTENTIVE TYPE: ICD-10-CM

## 2025-04-24 DIAGNOSIS — F41.9 ANXIETY DISORDER, UNSPECIFIED TYPE: ICD-10-CM

## 2025-04-24 DIAGNOSIS — F33.1 MODERATE EPISODE OF RECURRENT MAJOR DEPRESSIVE DISORDER (H): Primary | ICD-10-CM

## 2025-04-24 PROCEDURE — 90837 PSYTX W PT 60 MINUTES: CPT | Mod: 95 | Performed by: PSYCHOLOGIST

## 2025-04-28 NOTE — PROGRESS NOTES
[facility]  [address], [address]  [place] [address]  [phone]     OUTPATIENT PSYCHOTHERAPY ENCOUNTER      PATIENT     Name: Yanelis Lyons  YOB: 1975     SERVICES PROVIDED    Date of Service: 4/24/2025   Time of Service: 1019 to 1116 am (57 Minutes)   Type of Service: 87957 Individual Psychotherapy (53+ min)   Service Provider: David Vega, , LP     TELEMEDICINE ENCOUNTER METHODS     Telemedicine psychotherapy was conducted due to the preference Yanelis during scheduling. The patient's condition was safely assessed and treated via synchronous audio and visual telemedicine encounter. A secure real time interactive audio and visual telecommunication system (videoconference via [facility]) was used for the visit.     Patient Visit Location: Home in [place]      Present For Encounter: Yanelis     Provider Visit Location: HIPAA compliant location within provider home       ENCOUNTER SYNOPSIS     Yanelis participated in a psychotherapy session today with David Vega, PhD, LP. Background information, history, current condition, and available medical record notes were reviewed, and a brief clinical interview was conducted with Yanelis to update the treatment focus and planning as indicated. There was opportunity for Yanelis to discuss and process recent life happenings. The session included providing Yanelis with an evidence-informed, strengths-based, whole-person mental health and wellbeing-focused model of psychotherapy (see Treatment Plan and Psychotherapy Interventions). Coordination of care was also planned with other healthcare providers working with Yanelis if indicated.     BACKGROUND INFORMATION (From 7/8/2024 Intake; Updated 7/18/2024, 2/20/2025, 3/20/2025)      Yanelis lives with her father and brother.  She has a BA degree in physics and a BA in English. 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    Yanelis is currently unemployed and her more recent work history is \"spotty.\"  She has reportedly been fired from many jobs due to ADHD symptoms.  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    Yanelis was diagnosed with breast cancer in 2022.  She was successfully treated with chemotherapy, mastectomy, and radiation.    Yanelis is receiving mental health care.  She receives psychiatric care from Dr. Leora Driscoll.  Her diagnosis is reported to be ADHD and she is currently on Adderall.      Yanelis is seeking ADHD informed psychotherapy with the current provider.  Her goal is to become \"reliable and productive again\" and learn how to deal with her executive functioning challenges.    Psychiatric Symptoms         The following symptoms and concerns are to be targeted in the treatment of Yanelis.      Inattention/Hyperactivity/Impulsivity: Reported she was diagnosed with ADHD at the age of 34; even though she is on Adderall, Yanelis reported current symptoms of inattention and to some degree also hyperactivity/impulsivity; in particular she gets overwhelmed with work/tasks and shuts down; indicated her executive functioning is poor and especially being disorganized, having a hard time staying focused, and getting frustrated a lot   Depression: Reported recurrent depressive symptoms including sadness, irritability, decreased energy, low motivation, poor concentration, sleep disturbance, negative thoughts    Hypomania/Josephine: None reported    Anxiety: Reported recurrent symptoms of worrying, ruminating, nervousness, heightened physical tension, avoidance     Panic: None reported " "  Obsessions/Compulsions: None reported   Post-Traumatic Stress: None reported   Sleep Problems: Reported her sleep is a \"mess\" since being treated for cancer with side effects coming from the many medications she has been on; noted she uses trazodone intermittently for sleep as needed; had 2 prior sleep studies and was determined to be \"normal\" without apnea or restlessness  Alcohol Abuse: None reported   Drug Abuse: None reported     Psychotic: None reported     Suicidal Thoughts: None reported recently; had suicidal ideation as a teenager    Cutting/Self-Injury: None reported currently; had self punching/hitting in her teens to about age 23         Functional Concerns      The following functional concerns are to be targeted in the treatment of Yanelis.      Home: Reported struggling with household tasks and upkeep      School: Reported having difficulty being organized and completing homework and academic tasks in high school and college      Work: Reported is distractible, off task, forgets things, etc. on the job and she has been fired from several jobs as a result    Financial: Reported prior history of significant credit card debt due to overspending        History     The following reflect pertinent historical findings identified for Yanelis.      Developmental Delays: None reported    Family Problems: Reported that her mother was impatient with her growing up and often yelled at her; indicated that her mother passed away in 2021 from a lung disease; observed that her mother was \"the center of the family\" and the family is struggling without her    Child Abuse/Neglect/Trauma: None reported    Educational Problems: None reported    Mental Health Problems: Reported had undiagnosed depression during her teenage years and that she was officially diagnosed while she attended college; noted she has been struggling with recurrent depressive symptoms throughout adulthood    Physical Health Problems: Reported " prior history of breast cancer that was successfully treated and is currently in remission; noted recently being diagnosed with postural orthostatic tachycardia syndrome (POTS)  Family History of Mental Health Problems: Reported suspicion that her father has ADHD but this has not been officially diagnosed; believes her brother is depressed     Other Problems: None reported      Strengths and Protective Factors      The following reflect strengths and protective factors that could be leveraged in treatment of Yanelis.      Personal: Reported she is persistent and that she is good with knitting, sewing, cross stitching, and painting     Support System: Reported to be her father and brother      CHECK-IN FOR THIS ENCOUNTER     Recent Happenings     Checked in with Yanelis to review how it has been going recently     Noteworthy Events: Reported routine life events   Noteworthy Stressors/Problems: Reported stress related to changes in her personal Medicare policy requiring her to call Medicare to clarify access and prescription coverage; noted too that she is going to a hotel convention over the weekend, and although she is looking forward to it, she does not go out much and it is hard for her to prepare mentally and physically for the trip            Recent Health Concerns     Checked in with Yanelis to review current health concerns and rated them ranging from 0 = Currently Low Concern to 10 = Currently Very High Concern.      Emotional Health Concerns: Current level of concern about overall distress, anxiety, depression, or stress - Historical to Current: Moderate; moderate; moderate; moderate to high; moderate; moderate; moderate to high; moderate; moderate; low to moderate; low to moderate; moderate to high; moderate to high; moderate; moderate; moderate to high; moderate to high; moderate to high; moderate; moderate to high; moderate to high; moderate; moderate; moderate  Notes: Reported continuing moderate  stress and anxiety              Physical Health Concerns: Current level of concern about overall physical health, physical activity, body weight, health habits, or pain - Historical to Current: Moderate; moderate; moderate; moderate; moderate; low to moderate; moderate; low; low; low; low; low; low; low to moderate; low; low to moderate; low to moderate; low to moderate; low to moderate; low to moderate; low to moderate; low to moderate; low to moderate; moderate  Notes: Reported again feeling generally healthy and sleeping okay, but experiencing some pain and she gets out of breath easily  Living Health Concerns: Current level of concern about functioning at home, at school and/or work, and in important relationships - Historical to Current: NA  Notes: Did not review    Recent Safety Concerns    Self: None reported   Others: None reported       TREATMENT PLAN AND PSYCHOTHERAPY UPDATE FOR THIS ENCOUNTER     Diagnoses Being Treated      Yanelis qualifies for diagnoses of major depression, recurrent, moderate; ADHD-I; anxiety disorder, unspecified     Treatment Symptoms Goals Addressed and Progress      Based on the Background Information above, the following personalized psychiatric symptom domains for Yanelis are targeted for improvement through psychotherapy. The goal is to achieve an 8 or higher over the course of treatment. Progress to date ranges from 0 Limited to 5 Some to 10 Significant. This progress is episodically reviewed with Yanelis to make treatment decisions.     Improve Understanding of Condition/Illness: Baseline estimated to be 6; 7; 8; 8; 8; 8  Reduce Inattention: Baseline estimated to be 5; 6; 6; 7; 6; 6  Reduce Depression: Baseline estimated to be 5; 6; 6; 6; 6; 5  Reduce Anxiety: Baseline estimated to be 6; 6; 6; 5; 6; 5  Reduce Sleep Problems and Fatigue: Baseline estimated to be 5; 6; 7; 7; 7; 6    Functional Goals Addressed and Progress      Based on the Background Information above,  the following personalized functional domains for Yanelis are targeted for improvement through psychotherapy. The goal is to achieve an 8 or higher over the course of treatment. Progress to date ranges from 0 Limited to 5 Some to 10 Significant. This progress is episodically reviewed with Yanelis to make treatment decisions.     Home: Baseline estimated to be 5; 6; 7; 7; 6; 5  School: NA currently  Work: NA currently  Financial: NA currently    Mental Health and Wellbeing Health Practices Addressed and Progress     Using a brief psychotherapy collaborative decision aid, Yanelis was guided to make self-ratings for perceived level of mastery in 12 Health Practices that are aligned with [facility] of Mental Health and Wellbeing. Ratings range from 0 = Not Good at This to 10 = Great at This. Ratings below were recorded on 7/8/2024.      Brain Capacity and Health  Rest Health Practice: I am well rested and energized -4  Hydrate Health Practice: I drink enough water every day and stay hydrated -6  Nourish Health Practice: I eat healthy -4  Move Health Practice: I am physically active -2    Daily and Relationship Functioning   Activate Health Practice: I am responsible and successful at home, work, school -3  Express Health Practice: I listen to others and express myself well -8  Connect Health Practice: I have positive, healthy and meaningful relationships -7    Emotions  Soothe Health Practice: I am calm and manage my emotions -5.5  Think Health Practice: I have positive, hopeful and helpful thoughts -5  Observe Health Practice: I am thankful, accepting, and live in the moment -7.5    What Matters in Life  Seek Health Practice: I do things that give me meaning and purpose -7.5  Believe Health Practice: My values and beliefs guide how I am living -7.5    Psychotherapy Interventions Provided to Improve Symptoms, Functioning, and Mental Health and Wellbeing      The first part of the session focused on the  difficulties mentally and physically of preparing for a weekend fan convention (for the show [employer]) in [facility].  There is lots of physical needs that she needs to take care of when not at home and this is difficult.  Furthermore she is having some social anxiety because she has not been out very much.  She has lots of to do lists.  Supported, validated, and encouraged Yanelis to work through her stress and participate in the weekend convention.        Again encouraged Yanelis to focus on behavioral activation (see below).  She is doing more activation.  She is contemplating getting her bicycle tuned up to enable her to exercise more.    This session also focused on a strengths-based, whole-person mental health and wellbeing-focused model of psychotherapy for Christel. This approach incorporates empirically validated practice elements derived from cognitive behavioral therapy, acceptance and commitment therapy, positive psychology, mindfulness, and habit formation science. Specific psychotherapeutic modules within the model are personalized to help Yanelis work on and make progress with treatment goals. Culturally and trauma informed care principles are incorporated to contextualize therapeutic strategies to best fit the life experiences and views of Yanelis as indicated.     Based on above ratings and patient preference, Yanelis was guided to work on personalized psychotherapeutic modules within identified Health Practices that are italicized below to improve daily functioning and overall mental health and wellbeing. Instruction, training, guidance, along with traditional therapeutic processing, is used to promote growth in these areas for Yanelis.      Activate Health Practice: Being Responsible and Successful at Home, Work, and/or School  Conquer Avoidance Module: Avoiding less and actively doing more of what matters each day (behavioral activation and cognitive behavioral therapy)    Accomplish More Module: Doing what needs to be done each day (executive functioning skills, behavioral activation, and habit formation)  Solve Problems Module: Overcome everyday problems to be more successful in what matters to you (executive functioning skills and cognitive behavioral therapy)  Notes: Reviewed and reinforced Yanelis again for working on being more activated including sleep habits, healthier eating, small steps of cleaning around the home, spending time with friends and family, and exercising        Rest Health Practice: Renewing Your Energy, Being Well Rested, and Getting Good Sleep  Sleep Well Module: Building the habits and routines needed to sleep well every night (cognitive behavioral therapy and habit formation)    Conquer Insomnia Module: Using proven strategies for overcoming sleeplessness (cognitive behavioral therapy and habit formation)  Generate More Energy Module: Having a routine of doing revitalizing activities (behavioral activation and cognitive behavioral therapy)    Notes: Briefly reviewed and reinforced Yanelis again to utilize sleep hygiene habits with a focus on improving her sleep schedule of midnight to 8 AM      Soothe Health Practice: Being Calm and Managing Your Emotions   Calm Your Body and Mind Module: Calming your body and mind whenever you are stressed (cognitive behavioral therapy and acceptance commitment therapy)   Practice Meditation Module: Being calmer by practicing meditation for a few moments each day (mindfulness, cognitive behavioral therapy, and positive psychology)  Embrace Discomfort Module: Using skills to cope with distress (cognitive behavioral therapy and acceptance commitment therapy)   Notes: Briefly reviewed and reinforced Yanelis again to utilize calm your body and mind strategy along with breathing meditation      MENTAL STATUS EXAM     Yanelis was observed for the following indicators of mental status.     Appearance/Behavior: Adequate  grooming, appropriate attire, good eye contact, calm, cooperative, no abnormal movements   Speech: Rate, volume and tone appropriate; no push or pressure; no rapid speech     Mood/Affect: Calm, affect congruent to situation   Thought Process/Content: Coherent, linear; no reports or evidence of auditory hallucinations; no reports or evidence of visual hallucinations    Thought Associations: Logical, no looseness of associations, no flight of ideas, no tangentially, no circumstantiality   Insight/Judgment: Adequate insight into condition and need for treatment; judgement not impaired   Orientation: Alert and oriented to person place, time, and circumstance   Recent and Past Memory: Good    Attention Span/Concentration: Good     Language: English with estimated vocabulary to be average or above   Fund of Knowledge: Estimated to be average or above fund of knowledge      SAFETY PLAN - NA, no SI or SIB     RECOMMENDATIONS     Based on a medical records review and the results of this encounter, it is recommended that Yanelis participate in the following behavioral health services.      Mental Health Outpatient Psychotherapy: Individual-focused skills and habit training with therapeutic processing to improve mental health and wellbeing, conducted by Dr. Vega   Psychiatric Evaluation and Services: Continue consulting with Dr. Driscoll for psychiatric care including psychiatric diagnostic clarification, medication management, care coordination, and psychotherapy as needed    Primary and Specialty Care Services: Continue consulting with specialty care providers for cancer and PCP for healthcare, medication management, and care coordination as needed        DISPOSITION AND PLAN      Psychotherapy was conducted with Yanelis to make progress on treatment goals, enhance daily functioning, and improve mental health and wellbeing. Motivational interviewing was used to promote insight and engagement and to personalize  psychotherapy interventions. If applicable, a safety plan was formulated and reviewed with Yanelis.      The engagement of Yanelis during the session is assessed by this provider as: High     The progress of Etienne on treatment plan goals and objectives is assessed by this provider as: Moderate        In line with feedback informed therapy, Yanelis was guided to complete ratings of perceived Helpfulness and Hopefulness regarding the current encounter, and the results and are recorded below. The results were collaboratively discussed to make adjustments or improvements in subsequent psychotherapy encounters.      Degree of helpfulness of this visit on a scale ranging from 0 is Not Helpful to 10 is Very Helpful - Historical to Current: Did not review  Degree of hopefulness in improving mental health and wellbeing on a scale ranging from 0 Not Hopeful to 10 Very Hopeful - Historical to Current: Did not review    The plan is for Yanelis to follow up with this provider for outpatient psychotherapy and will seek additional recommended services as indicated to continue work on treatment goals.     If there are any questions regarding this information please contact the [facility] at [phone].    David Vega, PhD, LP   Professor of Psychiatry and Behavioral Sciences  [facility]

## 2025-07-06 ENCOUNTER — HEALTH MAINTENANCE LETTER (OUTPATIENT)
Age: 50
End: 2025-07-06

## 2025-07-24 ENCOUNTER — VIRTUAL VISIT (OUTPATIENT)
Dept: PSYCHIATRY | Facility: CLINIC | Age: 50
End: 2025-07-24
Attending: PSYCHOLOGIST
Payer: MEDICARE

## 2025-07-24 DIAGNOSIS — F90.0 ATTENTION DEFICIT HYPERACTIVITY DISORDER (ADHD), PREDOMINANTLY INATTENTIVE TYPE: ICD-10-CM

## 2025-07-24 DIAGNOSIS — F41.9 ANXIETY DISORDER, UNSPECIFIED TYPE: ICD-10-CM

## 2025-07-24 DIAGNOSIS — F33.1 MODERATE EPISODE OF RECURRENT MAJOR DEPRESSIVE DISORDER (H): Primary | ICD-10-CM

## 2025-07-24 PROCEDURE — 90837 PSYTX W PT 60 MINUTES: CPT | Mod: 95 | Performed by: PSYCHOLOGIST

## 2025-07-27 NOTE — PROGRESS NOTES
[facility]  [address], [address]  [place] [address]  [phone]     OUTPATIENT PSYCHOTHERAPY ENCOUNTER      PATIENT     Name: Yanelis Lyons  YOB: 1975     SERVICES PROVIDED    Date of Service: 7/24/2025     Time of Service: 2:05 PM to 3:04 PM (59 Minutes)   Type of Service: 29457 Individual Psychotherapy (53+ min)   Service Provider: David Vega, , LP     TELEMEDICINE ENCOUNTER METHODS     Telemedicine psychotherapy was conducted due to the preference Yanelis during scheduling. The patient's condition was safely assessed and treated via synchronous audio and visual telemedicine encounter. A secure real time interactive audio and visual telecommunication system (videoconference via [employer]) was used for the visit.     Patient Visit Location: Home in [place]      Present For Encounter: Yanelis     Provider Visit Location: HIPAA compliant location within provider home       ENCOUNTER SYNOPSIS     Yanelis participated in a psychotherapy session today with David Vega, PhD, LP. Background information, history, current condition, and available medical record notes were reviewed, and a brief clinical interview was conducted with Yanelis to update the treatment focus and planning as indicated. There was opportunity for Yanelis to discuss and process recent life happenings. The session included providing Yanelis with an evidence-informed, strengths-based, whole-person mental health and wellbeing-focused model of psychotherapy (see Treatment Plan and Psychotherapy Interventions). Coordination of care was also planned with other healthcare providers working with Yanleis if indicated.     BACKGROUND INFORMATION (From 7/8/2024 Intake; Updated 7/18/2024, 2/20/2025, 3/20/2025)      Yanelis lives with her father and brother.  She has a BA degree in physics and a BA in English. 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    Yanelis is currently unemployed and her more recent work history is \"spotty.\"  She has reportedly been fired from many jobs due to ADHD symptoms.  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    Yanelis was diagnosed with breast cancer in 2022.  She was successfully treated with chemotherapy, mastectomy, and radiation.    Yanelis is receiving mental health care.  She receives psychiatric care.  Her diagnosis is reported to be ADHD and she is currently on Adderall.      Yanelis is seeking ADHD informed psychotherapy with the current provider.  Her goal is to become \"reliable and productive again\" and learn how to deal with her executive functioning challenges.    Psychiatric Symptoms         The following symptoms and concerns are to be targeted in the treatment of Yanelis.      Inattention/Hyperactivity/Impulsivity: Reported she was diagnosed with ADHD at the age of 34; even though she is on Adderall, Yanelis reported current symptoms of inattention and to some degree also hyperactivity/impulsivity; in particular she gets overwhelmed with work/tasks and shuts down; indicated her executive functioning is poor and especially being disorganized, having a hard time staying focused, and getting frustrated a lot   Depression: Reported recurrent depressive symptoms including sadness, irritability, decreased energy, low motivation, poor concentration, sleep disturbance, negative thoughts    Hypomania/Josephine: None reported    Anxiety: Reported recurrent symptoms of worrying, ruminating, nervousness, heightened physical tension, avoidance     Panic: None reported " "  Obsessions/Compulsions: None reported   Post-Traumatic Stress: None reported   Sleep Problems: Reported her sleep is a \"mess\" since being treated for cancer with side effects coming from the many medications she has been on; noted she uses trazodone intermittently for sleep as needed; had 2 prior sleep studies and was determined to be \"normal\" without apnea or restlessness  Alcohol Abuse: None reported   Drug Abuse: None reported     Psychotic: None reported     Suicidal Thoughts: None reported recently; had suicidal ideation as a teenager    Cutting/Self-Injury: None reported currently; had self punching/hitting in her teens to about age 23         Functional Concerns      The following functional concerns are to be targeted in the treatment of Yanelis.      Home: Reported struggling with household tasks and upkeep      School: Reported having difficulty being organized and completing homework and academic tasks in high school and college      Work: Reported is distractible, off task, forgets things, etc. on the job and she has been fired from several jobs as a result    Financial: Reported prior history of significant credit card debt due to overspending        History     The following reflect pertinent historical findings identified for Yanelis.      Developmental Delays: None reported    Family Problems: Reported that her mother was impatient with her growing up and often yelled at her; indicated that her mother passed away in 2021 from a lung disease; observed that her mother was \"the center of the family\" and the family is struggling without her    Child Abuse/Neglect/Trauma: None reported    Educational Problems: None reported    Mental Health Problems: Reported had undiagnosed depression during her teenage years and that she was officially diagnosed while she attended college; noted she has been struggling with recurrent depressive symptoms throughout adulthood    Physical Health Problems: Reported " "prior history of breast cancer that was successfully treated and is currently in remission; noted recently being diagnosed with postural orthostatic tachycardia syndrome (POTS)  Family History of Mental Health Problems: Reported suspicion that her father has ADHD but this has not been officially diagnosed; believes her brother is depressed     Other Problems: None reported      Strengths and Protective Factors      The following reflect strengths and protective factors that could be leveraged in treatment of Yanelis.      Personal: Reported she is persistent and that she is good with knitting, sewing, cross stitching, and painting     Support System: Reported to be her father and brother      CHECK-IN FOR THIS ENCOUNTER     Recent Happenings     Checked in with Yanelis to review how it has been going recently     Noteworthy Events: Reported visiting her grandmother and going out with some friends recently  Noteworthy Stressors/Problems: Reported no particular stressor or problem lately             Recent Health Concerns     Checked in with Yanelis to review current health concerns and rated them ranging from 0 = Currently Low Concern to 10 = Currently Very High Concern.      Emotional Health Concerns: Current level of concern about overall distress, anxiety, depression, or stress - Historical to Current: Moderate; moderate; moderate; moderate to high; moderate; moderate; moderate to high; moderate; moderate; low to moderate; low to moderate; moderate to high; moderate to high; moderate; moderate; moderate to high; moderate to high; moderate to high; moderate; moderate to high; moderate to high; moderate; moderate; moderate; moderate; moderate; moderate; moderate  Notes: Reported ongoing moderate stress and being \"frustrated with herself\" because she is not being activated enough; noted that her psychiatrist is adjusting her dose on different medications and sometimes she feels groggy          Physical Health " Concerns: Current level of concern about overall physical health, physical activity, body weight, health habits, or pain - Historical to Current: Moderate; moderate; moderate; moderate; moderate; low to moderate; moderate; low; low; low; low; low; low; low to moderate; low; low to moderate; low to moderate; low to moderate; low to moderate; low to moderate; low to moderate; low to moderate; low to moderate; moderate; moderate; moderate; moderate; moderate: Moderate  Notes: Reported again feeling generally healthy but dealing with ongoing chronic health problems   Living Health Concerns: Current level of concern about functioning at home, at school and/or work, and in important relationships - Historical to Current: Moderate; moderate  Notes: Reported making some progress keeping the cat box , but is not really doing as much as she would like to    Recent Safety Concerns    Self: None reported   Others: None reported       TREATMENT PLAN AND PSYCHOTHERAPY UPDATE FOR THIS ENCOUNTER     Diagnoses Being Treated      Yanelis qualifies for diagnoses of major depression, recurrent, moderate; ADHD-I; anxiety disorder, unspecified     Treatment Symptoms Goals Addressed and Progress      Based on the Background Information above, the following personalized psychiatric symptom domains for Yanelis are targeted for improvement through psychotherapy. The goal is to achieve an 8 or higher over the course of treatment. Progress to date ranges from 0 Limited to 5 Some to 10 Significant. This progress is episodically reviewed with Yanelis to make treatment decisions.     Improve Understanding of Condition/Illness: Baseline estimated to be 6; 7; 8; 8; 8; 8; 8  Reduce Inattention: Baseline estimated to be 5; 6; 6; 7; 6; 6; 6  Reduce Depression: Baseline estimated to be 5; 6; 6; 6; 6; 5  Reduce Anxiety: Baseline estimated to be 6; 6; 6; 5; 6; 5  Reduce Sleep Problems and Fatigue: Baseline estimated to be 5; 6; 7; 7; 7; 6  6    Functional Goals Addressed and Progress      Based on the Background Information above, the following personalized functional domains for Yanelis are targeted for improvement through psychotherapy. The goal is to achieve an 8 or higher over the course of treatment. Progress to date ranges from 0 Limited to 5 Some to 10 Significant. This progress is episodically reviewed with Yanelis to make treatment decisions.     Home: Baseline estimated to be 5; 6; 7; 7; 6; 5: 4  School: NA currently  Work: NA currently  Financial: NA currently    Mental Health and Wellbeing Health Practices Addressed and Progress     Using a brief psychotherapy collaborative decision aid, Yanelis was guided to make self-ratings for perceived level of mastery in 12 Health Practices that are aligned with [facility] of Mental Health and Wellbeing. Ratings range from 0 = Not Good at This to 10 = Great at This. Ratings below were recorded on 7/8/2024.      Brain Capacity and Health  Rest Health Practice: I am well rested and energized -4  Hydrate Health Practice: I drink enough water every day and stay hydrated -6  Nourish Health Practice: I eat healthy -4  Move Health Practice: I am physically active -2    Daily and Relationship Functioning   Activate Health Practice: I am responsible and successful at home, work, school -3  Express Health Practice: I listen to others and express myself well -8  Connect Health Practice: I have positive, healthy and meaningful relationships -7    Emotions  Soothe Health Practice: I am calm and manage my emotions -5.5  Think Health Practice: I have positive, hopeful and helpful thoughts -5  Observe Health Practice: I am thankful, accepting, and live in the moment -7.5    What Matters in Life  Seek Health Practice: I do things that give me meaning and purpose -7.5  Believe Health Practice: My values and beliefs guide how I am living -7.5    Psychotherapy Interventions Provided to Improve Symptoms,  "Functioning, and Mental Health and Wellbeing      Most of the session again focused on motivational enhancement to help Yanelis become more activated.  Discussed and processed her underlying fear of becoming more activated.  She feels that we will then compel her to commit to eventually carrying it all the way through to finding a job.  She has a lot of worry and anxiety because she \"never succeeded in the job yet\" and fears that she would fail again.  Prior jobs were somewhat traumatic and thinking about going back to a job is triggering.  Did discuss how she could manage her anxiety about jobs, could redirect from negative to more helpful thinking, and she could find a job that was meaningful to her such as in  or website design (although these would involve training too).  Supported, validated, and encouraged Yanelis as she struggles with her difficulties with activation.    This session also focused on a strengths-based, whole-person mental health and wellbeing-focused model of psychotherapy for Christel. This approach incorporates empirically validated practice elements derived from cognitive behavioral therapy, acceptance and commitment therapy, positive psychology, mindfulness, and habit formation science. Specific psychotherapeutic modules within the model are personalized to help Yanelis work on and make progress with treatment goals. Culturally and trauma informed care principles are incorporated to contextualize therapeutic strategies to best fit the life experiences and views of Yanelis as indicated.     Based on above ratings and patient preference, Yanelis was guided to work on personalized psychotherapeutic modules within identified Health Practices that are italicized below to improve daily functioning and overall mental health and wellbeing. Instruction, training, guidance, along with traditional therapeutic processing, is used to promote growth in these areas for Yanelis.  "     Activate Health Practice: Being Responsible and Successful at Home, Work, and/or School  Conquer Avoidance Module: Avoiding less and actively doing more of what matters each day (behavioral activation and cognitive behavioral therapy)   Accomplish More Module: Doing what needs to be done each day (executive functioning skills, behavioral activation, and habit formation)  Solve Problems Module: Overcome everyday problems to be more successful in what matters to you (executive functioning skills and cognitive behavioral therapy)  Notes: Briefly reviewed and reinforced Yanelis again for working on being more activated including sleep habits, healthier eating, small steps of cleaning around the home, spending time with friends and family, and exercising        Rest Health Practice: Renewing Your Energy, Being Well Rested, and Getting Good Sleep  Sleep Well Module: Building the habits and routines needed to sleep well every night (cognitive behavioral therapy and habit formation)    Conquer Insomnia Module: Using proven strategies for overcoming sleeplessness (cognitive behavioral therapy and habit formation)  Generate More Energy Module: Having a routine of doing revitalizing activities (behavioral activation and cognitive behavioral therapy)    Notes: Briefly reviewed and reinforced Yanelis again to utilize sleep hygiene habits with a focus on improving her sleep schedule of midnight to 8 AM      Soothe Health Practice: Being Calm and Managing Your Emotions   Calm Your Body and Mind Module: Calming your body and mind whenever you are stressed (cognitive behavioral therapy and acceptance commitment therapy)   Practice Meditation Module: Being calmer by practicing meditation for a few moments each day (mindfulness, cognitive behavioral therapy, and positive psychology)  Embrace Discomfort Module: Using skills to cope with distress (cognitive behavioral therapy and acceptance commitment therapy)   Notes: Briefly  reviewed and reinforced Maritza for working on a calm the body and mind practice    MENTAL STATUS EXAM     Yanelis was observed for the following indicators of mental status.     Appearance/Behavior: Adequate grooming, appropriate attire, good eye contact, calm, cooperative, no abnormal movements   Speech: Rate, volume and tone appropriate; no push or pressure; no rapid speech     Mood/Affect: Calm, affect congruent to situation   Thought Process/Content: Coherent, linear; no reports or evidence of auditory hallucinations; no reports or evidence of visual hallucinations    Thought Associations: Logical, no looseness of associations, no flight of ideas, no tangentially, no circumstantiality   Insight/Judgment: Adequate insight into condition and need for treatment; judgement not impaired   Orientation: Alert and oriented to person place, time, and circumstance   Recent and Past Memory: Good    Attention Span/Concentration: Good     Language: English with estimated vocabulary to be average or above   Fund of Knowledge: Estimated to be average or above fund of knowledge      SAFETY PLAN - NA, no SI or SIB     RECOMMENDATIONS     Based on a medical records review and the results of this encounter, it is recommended that Yanelis participate in the following behavioral health services.      Mental Health Outpatient Psychotherapy: Individual-focused skills and habit training with therapeutic processing to improve mental health and wellbeing, conducted by Dr. eVga   Psychiatric Evaluation and Services: Continue consulting with new psychiatrist for psychiatric care including psychiatric diagnostic clarification, medication management, care coordination, and psychotherapy as needed    Primary and Specialty Care Services: Continue consulting with specialty care providers for cancer and PCP for healthcare, medication management, and care coordination as needed        DISPOSITION AND PLAN      Psychotherapy was  conducted with Yanelis to make progress on treatment goals, enhance daily functioning, and improve mental health and wellbeing. Motivational interviewing was used to promote insight and engagement and to personalize psychotherapy interventions. If applicable, a safety plan was formulated and reviewed with Yanelis.      The engagement of Yanelis during the session is assessed by this provider as: High     The progress of Etienne on treatment plan goals and objectives is assessed by this provider as: Low to moderate        In line with feedback informed therapy, Yanelis was guided to complete ratings of perceived Helpfulness and Hopefulness regarding the current encounter, and the results and are recorded below. The results were collaboratively discussed to make adjustments or improvements in subsequent psychotherapy encounters.      Degree of helpfulness of this visit on a scale ranging from 0 is Not Helpful to 10 is Very Helpful - Historical to Current: Did not review  Degree of hopefulness in improving mental health and wellbeing on a scale ranging from 0 Not Hopeful to 10 Very Hopeful - Historical to Current: Did not review    The plan is for Yanleis to follow up with this provider for outpatient psychotherapy and will seek additional recommended services as indicated to continue work on treatment goals.     If there are any questions regarding this information please contact the [facility] at [phone].    David Vega, PhD, LP   Professor of Psychiatry and Behavioral Sciences  [facility]

## 2025-08-14 ENCOUNTER — VIRTUAL VISIT (OUTPATIENT)
Dept: PSYCHIATRY | Facility: CLINIC | Age: 50
End: 2025-08-14
Attending: PSYCHOLOGIST
Payer: MEDICARE

## 2025-08-14 DIAGNOSIS — F90.0 ATTENTION DEFICIT HYPERACTIVITY DISORDER (ADHD), PREDOMINANTLY INATTENTIVE TYPE: ICD-10-CM

## 2025-08-14 DIAGNOSIS — F41.9 ANXIETY DISORDER, UNSPECIFIED TYPE: ICD-10-CM

## 2025-08-14 DIAGNOSIS — F33.1 MODERATE EPISODE OF RECURRENT MAJOR DEPRESSIVE DISORDER (H): Primary | ICD-10-CM

## 2025-08-14 PROCEDURE — 90837 PSYTX W PT 60 MINUTES: CPT | Mod: 95 | Performed by: PSYCHOLOGIST

## 2025-08-28 ENCOUNTER — VIRTUAL VISIT (OUTPATIENT)
Dept: PSYCHIATRY | Facility: CLINIC | Age: 50
End: 2025-08-28
Attending: PSYCHOLOGIST
Payer: MEDICARE

## 2025-08-28 DIAGNOSIS — F33.1 MODERATE EPISODE OF RECURRENT MAJOR DEPRESSIVE DISORDER (H): Primary | ICD-10-CM

## 2025-08-28 DIAGNOSIS — F90.0 ATTENTION DEFICIT HYPERACTIVITY DISORDER (ADHD), PREDOMINANTLY INATTENTIVE TYPE: ICD-10-CM

## 2025-08-28 DIAGNOSIS — F41.9 ANXIETY DISORDER, UNSPECIFIED TYPE: ICD-10-CM

## 2025-08-28 PROCEDURE — 90837 PSYTX W PT 60 MINUTES: CPT | Mod: 95 | Performed by: PSYCHOLOGIST
